# Patient Record
Sex: MALE | Employment: UNEMPLOYED | URBAN - METROPOLITAN AREA
[De-identification: names, ages, dates, MRNs, and addresses within clinical notes are randomized per-mention and may not be internally consistent; named-entity substitution may affect disease eponyms.]

---

## 2020-01-01 ENCOUNTER — APPOINTMENT (OUTPATIENT)
Dept: GENERAL RADIOLOGY | Facility: CLINIC | Age: 50
DRG: 441 | End: 2020-01-01

## 2020-01-01 ENCOUNTER — COMMITTEE REVIEW (OUTPATIENT)
Dept: TRANSPLANT | Facility: CLINIC | Age: 50
End: 2020-01-01

## 2020-01-01 ENCOUNTER — TRANSFERRED RECORDS (OUTPATIENT)
Dept: HEALTH INFORMATION MANAGEMENT | Facility: CLINIC | Age: 50
End: 2020-01-01

## 2020-01-01 ENCOUNTER — APPOINTMENT (OUTPATIENT)
Dept: ULTRASOUND IMAGING | Facility: CLINIC | Age: 50
DRG: 441 | End: 2020-01-01

## 2020-01-01 ENCOUNTER — REFERRAL (OUTPATIENT)
Dept: TRANSPLANT | Facility: CLINIC | Age: 50
End: 2020-01-01

## 2020-01-01 ENCOUNTER — APPOINTMENT (OUTPATIENT)
Dept: GENERAL RADIOLOGY | Facility: CLINIC | Age: 50
DRG: 441 | End: 2020-01-01
Attending: EMERGENCY MEDICINE

## 2020-01-01 ENCOUNTER — APPOINTMENT (OUTPATIENT)
Dept: CT IMAGING | Facility: CLINIC | Age: 50
DRG: 441 | End: 2020-01-01

## 2020-01-01 ENCOUNTER — APPOINTMENT (OUTPATIENT)
Dept: ULTRASOUND IMAGING | Facility: CLINIC | Age: 50
DRG: 441 | End: 2020-01-01
Attending: EMERGENCY MEDICINE

## 2020-01-01 ENCOUNTER — APPOINTMENT (OUTPATIENT)
Dept: CARDIOLOGY | Facility: CLINIC | Age: 50
DRG: 441 | End: 2020-01-01

## 2020-01-01 ENCOUNTER — HOSPITAL ENCOUNTER (INPATIENT)
Facility: CLINIC | Age: 50
LOS: 2 days | DRG: 441 | End: 2020-07-22
Attending: EMERGENCY MEDICINE | Admitting: INTERNAL MEDICINE

## 2020-01-01 VITALS
HEART RATE: 107 BPM | OXYGEN SATURATION: 69 % | HEIGHT: 60 IN | RESPIRATION RATE: 24 BRPM | TEMPERATURE: 96.2 F | BODY MASS INDEX: 24.84 KG/M2 | DIASTOLIC BLOOD PRESSURE: 47 MMHG | SYSTOLIC BLOOD PRESSURE: 98 MMHG | WEIGHT: 126.54 LBS

## 2020-01-01 DIAGNOSIS — N17.9 ACUTE RENAL FAILURE, UNSPECIFIED ACUTE RENAL FAILURE TYPE (H): ICD-10-CM

## 2020-01-01 DIAGNOSIS — Z20.822 2019 NOVEL CORONAVIRUS NOT DETECTED: ICD-10-CM

## 2020-01-01 DIAGNOSIS — K74.60 CIRRHOSIS OF LIVER (H): Primary | ICD-10-CM

## 2020-01-01 DIAGNOSIS — K75.9 HEPATITIS: ICD-10-CM

## 2020-01-01 DIAGNOSIS — K92.0 GASTROINTESTINAL HEMORRHAGE WITH HEMATEMESIS: ICD-10-CM

## 2020-01-01 DIAGNOSIS — K92.1 GASTROINTESTINAL HEMORRHAGE WITH MELENA: ICD-10-CM

## 2020-01-01 LAB
A1AT PHENOTYP SERPL-IMP: NORMAL
A1AT SERPL-MCNC: 185 MG/DL (ref 90–200)
ABO + RH BLD: NORMAL
ACETAMINOPHEN QUAL: NEGATIVE
ACETAMINOPHEN QUAL: POSITIVE
ALBUMIN SERPL-MCNC: 1.2 G/DL (ref 3.4–5)
ALBUMIN SERPL-MCNC: 1.2 G/DL (ref 3.4–5)
ALBUMIN SERPL-MCNC: 1.4 G/DL (ref 3.4–5)
ALBUMIN UR-MCNC: 100 MG/DL
ALCOHOL.XXX SERPLBLD QL: NEGATIVE
ALCOHOL.XXX SERPLBLD QL: NORMAL
ALP SERPL-CCNC: 536 U/L (ref 40–150)
ALP SERPL-CCNC: 564 U/L (ref 40–150)
ALP SERPL-CCNC: 607 U/L (ref 40–150)
ALP SERPL-CCNC: 608 U/L (ref 40–150)
ALP SERPL-CCNC: 653 U/L (ref 40–150)
ALT SERPL W P-5'-P-CCNC: 1794 U/L (ref 0–70)
ALT SERPL W P-5'-P-CCNC: 1860 U/L (ref 0–70)
ALT SERPL W P-5'-P-CCNC: 1969 U/L (ref 0–70)
ALT SERPL W P-5'-P-CCNC: 2410 U/L (ref 0–70)
ALT SERPL W P-5'-P-CCNC: 2553 U/L (ref 0–70)
AMANTADINE: NEGATIVE
AMITRIPTYLINE QUAL: NEGATIVE
AMMONIA PLAS-SCNC: 70 UMOL/L (ref 10–50)
AMMONIA PLAS-SCNC: 71 UMOL/L (ref 10–50)
AMOBARBITAL QUAL: NEGATIVE
AMORPH CRY #/AREA URNS HPF: ABNORMAL /HPF
AMOXAPINE: NEGATIVE
AMPHETAMINES QUAL: NEGATIVE
ANA SER QL IF: NEGATIVE
ANION GAP SERPL CALCULATED.3IONS-SCNC: 14 MMOL/L (ref 3–14)
ANION GAP SERPL CALCULATED.3IONS-SCNC: 15 MMOL/L (ref 3–14)
ANION GAP SERPL CALCULATED.3IONS-SCNC: 15 MMOL/L (ref 3–14)
ANION GAP SERPL CALCULATED.3IONS-SCNC: 16 MMOL/L (ref 3–14)
ANION GAP SERPL CALCULATED.3IONS-SCNC: 16 MMOL/L (ref 3–14)
ANION GAP SERPL CALCULATED.3IONS-SCNC: 17 MMOL/L (ref 3–14)
ANION GAP SERPL CALCULATED.3IONS-SCNC: 19 MMOL/L (ref 3–14)
ANION GAP SERPL CALCULATED.3IONS-SCNC: 21 MMOL/L (ref 3–14)
ANION GAP SERPL CALCULATED.3IONS-SCNC: 21 MMOL/L (ref 3–14)
ANION GAP SERPL CALCULATED.3IONS-SCNC: 24 MMOL/L (ref 3–14)
ANION GAP SERPL CALCULATED.3IONS-SCNC: 26 MMOL/L (ref 3–14)
ANION GAP SERPL CALCULATED.3IONS-SCNC: 34 MMOL/L (ref 3–14)
ANISOCYTOSIS BLD QL SMEAR: SLIGHT
ANISOCYTOSIS BLD QL SMEAR: SLIGHT
APAP SERPL-MCNC: 12.6 UG/ML (ref 10–30)
APAP SERPL-MCNC: 25.8 UG/ML (ref 10–30)
APAP SERPL-MCNC: NORMAL MG/L (ref 10–20)
APAP SERPL-MCNC: NORMAL MG/L (ref 10–20)
APPEARANCE FLD: NORMAL
APPEARANCE UR: ABNORMAL
APTT PPP: 110 SEC (ref 22–37)
APTT PPP: 111 SEC (ref 22–37)
APTT PPP: 122 SEC (ref 22–37)
APTT PPP: 133 SEC (ref 22–37)
APTT PPP: 161 SEC (ref 22–37)
APTT PPP: 212 SEC (ref 22–37)
AST SERPL W P-5'-P-CCNC: 5392 U/L (ref 0–45)
AST SERPL W P-5'-P-CCNC: 6002 U/L (ref 0–45)
AST SERPL W P-5'-P-CCNC: 6396 U/L (ref 0–45)
AST SERPL W P-5'-P-CCNC: 9141 U/L (ref 0–45)
AST SERPL W P-5'-P-CCNC: 9606 U/L (ref 0–45)
ATROPINE: NEGATIVE
BACTERIA #/AREA URNS HPF: ABNORMAL /HPF
BACTERIA SPEC CULT: NO GROWTH
BARBITAL QUAL: NEGATIVE
BASE DEFICIT BLDA-SCNC: 10 MMOL/L
BASE DEFICIT BLDA-SCNC: 10.2 MMOL/L
BASE DEFICIT BLDA-SCNC: 14.2 MMOL/L
BASE DEFICIT BLDA-SCNC: 14.6 MMOL/L
BASE DEFICIT BLDA-SCNC: 15.9 MMOL/L
BASE DEFICIT BLDA-SCNC: 16.6 MMOL/L
BASE DEFICIT BLDA-SCNC: 19 MMOL/L
BASE DEFICIT BLDA-SCNC: 6 MMOL/L
BASE DEFICIT BLDA-SCNC: 6.9 MMOL/L
BASE DEFICIT BLDA-SCNC: 7.3 MMOL/L
BASE DEFICIT BLDA-SCNC: 7.3 MMOL/L
BASE DEFICIT BLDA-SCNC: 7.7 MMOL/L
BASE DEFICIT BLDV-SCNC: 12.4 MMOL/L
BASE DEFICIT BLDV-SCNC: 13.2 MMOL/L
BASE DEFICIT BLDV-SCNC: 14.9 MMOL/L
BASOPHILS # BLD AUTO: 0 10E9/L (ref 0–0.2)
BASOPHILS NFR BLD AUTO: 0 %
BENZODIAZ UR QL: NEGATIVE
BILIRUB DIRECT SERPL-MCNC: 13.3 MG/DL (ref 0–0.2)
BILIRUB DIRECT SERPL-MCNC: 13.3 MG/DL (ref 0–0.2)
BILIRUB SERPL-MCNC: 11 MG/DL (ref 0.2–1.3)
BILIRUB SERPL-MCNC: 16.3 MG/DL (ref 0.2–1.3)
BILIRUB SERPL-MCNC: 17.3 MG/DL (ref 0.2–1.3)
BILIRUB SERPL-MCNC: 17.6 MG/DL (ref 0.2–1.3)
BILIRUB SERPL-MCNC: 18 MG/DL (ref 0.2–1.3)
BILIRUB SERPL-MCNC: 19.7 MG/DL (ref 0.2–1.3)
BILIRUB UR QL STRIP: ABNORMAL
BLD GP AB SCN SERPL QL: NORMAL
BLD GP AB SCN SERPL QL: NORMAL
BLD PROD TYP BPU: NORMAL
BLD UNIT ID BPU: 0
BLOOD BANK CMNT PATIENT-IMP: NORMAL
BLOOD BANK CMNT PATIENT-IMP: NORMAL
BLOOD PRODUCT CODE: NORMAL
BPU ID: NORMAL
BUN SERPL-MCNC: 100 MG/DL (ref 7–30)
BUN SERPL-MCNC: 51 MG/DL (ref 7–30)
BUN SERPL-MCNC: 61 MG/DL (ref 7–30)
BUN SERPL-MCNC: 76 MG/DL (ref 7–30)
BUN SERPL-MCNC: 90 MG/DL (ref 7–30)
BUN SERPL-MCNC: 92 MG/DL (ref 7–30)
BUN SERPL-MCNC: 94 MG/DL (ref 7–30)
BUN SERPL-MCNC: 94 MG/DL (ref 7–30)
BUN SERPL-MCNC: 96 MG/DL (ref 7–30)
BUN SERPL-MCNC: 97 MG/DL (ref 7–30)
BUN SERPL-MCNC: 98 MG/DL (ref 7–30)
BUN SERPL-MCNC: 99 MG/DL (ref 7–30)
BUPROPION QUAL: NEGATIVE
BURR CELLS BLD QL SMEAR: ABNORMAL
BURR CELLS BLD QL SMEAR: ABNORMAL
BUTABARBITAL QUAL: NEGATIVE
BUTALBITAL QUAL: NEGATIVE
C COLI+JEJUNI+LARI FUSA STL QL NAA+PROBE: NOT DETECTED
C DIFF TOX B STL QL: NEGATIVE
C PNEUM DNA SPEC QL NAA+PROBE: NOT DETECTED
CA-I BLD-MCNC: 2.8 MG/DL (ref 4.4–5.2)
CA-I BLD-MCNC: 2.9 MG/DL (ref 4.4–5.2)
CA-I BLD-MCNC: 3 MG/DL (ref 4.4–5.2)
CA-I BLD-MCNC: 3 MG/DL (ref 4.4–5.2)
CA-I BLD-MCNC: 3.2 MG/DL (ref 4.4–5.2)
CA-I BLD-MCNC: 3.4 MG/DL (ref 4.4–5.2)
CA-I BLD-MCNC: 3.6 MG/DL (ref 4.4–5.2)
CA-I BLD-MCNC: 3.9 MG/DL (ref 4.4–5.2)
CA-I BLD-MCNC: 4.4 MG/DL (ref 4.4–5.2)
CA-I BLD-MCNC: 4.4 MG/DL (ref 4.4–5.2)
CA-I BLD-MCNC: 4.7 MG/DL (ref 4.4–5.2)
CA-I BLD-SCNC: 2.9 MG/DL (ref 4.4–5.2)
CA-I SERPL ISE-MCNC: 3.7 MG/DL (ref 4.4–5.2)
CA-I SERPL ISE-MCNC: 4.2 MG/DL (ref 4.4–5.2)
CAFFEINE QUAL: NEGATIVE
CAFFEINE QUAL: NEGATIVE
CALCIUM SERPL-MCNC: 5 MG/DL (ref 8.5–10.1)
CALCIUM SERPL-MCNC: 5.1 MG/DL (ref 8.5–10.1)
CALCIUM SERPL-MCNC: 5.1 MG/DL (ref 8.5–10.1)
CALCIUM SERPL-MCNC: 5.2 MG/DL (ref 8.5–10.1)
CALCIUM SERPL-MCNC: 5.8 MG/DL (ref 8.5–10.1)
CALCIUM SERPL-MCNC: 6.4 MG/DL (ref 8.5–10.1)
CALCIUM SERPL-MCNC: 7 MG/DL (ref 8.5–10.1)
CALCIUM SERPL-MCNC: 7.2 MG/DL (ref 8.5–10.1)
CALCIUM SERPL-MCNC: 7.2 MG/DL (ref 8.5–10.1)
CALCIUM SERPL-MCNC: 7.8 MG/DL (ref 8.5–10.1)
CALCIUM SERPL-MCNC: 8.5 MG/DL (ref 8.5–10.1)
CALCIUM SERPL-MCNC: <5 MG/DL (ref 8.5–10.1)
CANNABINOIDS UR QL SCN: NEGATIVE
CARBAMAZEPINE QUAL: NEGATIVE
CARBAMAZEPINE QUAL: NEGATIVE
CARISOPRODOL QUAL: NEGATIVE
CERULOPLASMIN SERPL-MCNC: 28 MG/DL (ref 20–60)
CHLORIDE SERPL-SCNC: 100 MMOL/L (ref 94–109)
CHLORIDE SERPL-SCNC: 101 MMOL/L (ref 94–109)
CHLORIDE SERPL-SCNC: 102 MMOL/L (ref 94–109)
CHLORIDE SERPL-SCNC: 102 MMOL/L (ref 94–109)
CHLORIDE SERPL-SCNC: 104 MMOL/L (ref 94–109)
CHLORIDE SERPL-SCNC: 105 MMOL/L (ref 94–109)
CHLORIDE SERPL-SCNC: 106 MMOL/L (ref 94–109)
CHLORIDE SERPL-SCNC: 106 MMOL/L (ref 94–109)
CHLORIDE SERPL-SCNC: 98 MMOL/L (ref 94–109)
CHLORIDE SERPL-SCNC: 99 MMOL/L (ref 94–109)
CHLORPHENIRAMINE: NEGATIVE
CHLORPROMAZINE: NEGATIVE
CHLORPROPAMIDE UR-MCNC: NEGATIVE UG/ML
CHOLEST SERPL-MCNC: ABNORMAL MG/DL
CITALOPRAM QUAL: NEGATIVE
CK SERPL-CCNC: 1674 U/L (ref 30–300)
CK SERPL-CCNC: 716 U/L (ref 30–300)
CLOMIPRAMINE QUAL: NEGATIVE
CMV DNA SPEC NAA+PROBE-ACNC: NORMAL [IU]/ML
CMV DNA SPEC NAA+PROBE-ACNC: NORMAL [IU]/ML
CMV DNA SPEC NAA+PROBE-LOG#: NORMAL {LOG_IU}/ML
CMV DNA SPEC NAA+PROBE-LOG#: NORMAL {LOG_IU}/ML
CMV IGG SERPL QL IA: 7.7 AI (ref 0–0.8)
CO2 BLDCOV-SCNC: 7 MMOL/L (ref 21–28)
CO2 SERPL-SCNC: 10 MMOL/L (ref 20–32)
CO2 SERPL-SCNC: 10 MMOL/L (ref 20–32)
CO2 SERPL-SCNC: 11 MMOL/L (ref 20–32)
CO2 SERPL-SCNC: 12 MMOL/L (ref 20–32)
CO2 SERPL-SCNC: 13 MMOL/L (ref 20–32)
CO2 SERPL-SCNC: 14 MMOL/L (ref 20–32)
CO2 SERPL-SCNC: 18 MMOL/L (ref 20–32)
CO2 SERPL-SCNC: 18 MMOL/L (ref 20–32)
CO2 SERPL-SCNC: 19 MMOL/L (ref 20–32)
CO2 SERPL-SCNC: 19 MMOL/L (ref 20–32)
CO2 SERPL-SCNC: 21 MMOL/L (ref 20–32)
CO2 SERPL-SCNC: 7 MMOL/L (ref 20–32)
COCAINE QUAL: NEGATIVE
COCAINE UR QL: NEGATIVE
CODEINE QUAL: NEGATIVE
COLOR FLD: YELLOW
COLOR UR AUTO: ABNORMAL
COPATH REPORT: NORMAL
COPATH REPORT: NORMAL
CREAT SERPL-MCNC: 3.77 MG/DL (ref 0.66–1.25)
CREAT SERPL-MCNC: 4.38 MG/DL (ref 0.66–1.25)
CREAT SERPL-MCNC: 5.33 MG/DL (ref 0.66–1.25)
CREAT SERPL-MCNC: 6.05 MG/DL (ref 0.66–1.25)
CREAT SERPL-MCNC: 6.27 MG/DL (ref 0.66–1.25)
CREAT SERPL-MCNC: 6.42 MG/DL (ref 0.66–1.25)
CREAT SERPL-MCNC: 6.62 MG/DL (ref 0.66–1.25)
CREAT SERPL-MCNC: 6.63 MG/DL (ref 0.66–1.25)
CREAT SERPL-MCNC: 6.76 MG/DL (ref 0.66–1.25)
CREAT SERPL-MCNC: 6.8 MG/DL (ref 0.66–1.25)
CREAT SERPL-MCNC: 7.01 MG/DL (ref 0.66–1.25)
CREAT SERPL-MCNC: 7.01 MG/DL (ref 0.66–1.25)
CREAT UR-MCNC: 97 MG/DL
DEPRECATED CALCIDIOL+CALCIFEROL SERPL-MC: <10 UG/L (ref 20–75)
DESIPRAMINE QUAL: NEGATIVE
DEXTROMETHORPHAN: NEGATIVE
DIFFERENTIAL METHOD BLD: ABNORMAL
DIPHENHYDRAMINE: NEGATIVE
DOXEPIN/METABOLITE: NEGATIVE
DOXYLAMINE: NEGATIVE
DRUGS SERPL SCN: NEGATIVE
EBV DNA # SPEC NAA+PROBE: ABNORMAL {COPIES}/ML
EBV DNA SPEC NAA+PROBE-LOG#: 5.8 {LOG_COPIES}/ML
EBV VCA IGG SER QL IA: 4.3 AI (ref 0–0.8)
EC STX1 GENE STL QL NAA+PROBE: NOT DETECTED
EC STX2 GENE STL QL NAA+PROBE: NOT DETECTED
EJECTION FRACTION: NORMAL %
ENTERIC PATHOGEN COMMENT: NORMAL
EOSINOPHIL # BLD AUTO: 0 10E9/L (ref 0–0.7)
EOSINOPHIL # BLD AUTO: 0.1 10E9/L (ref 0–0.7)
EOSINOPHIL NFR BLD AUTO: 3 %
EOSINOPHIL NFR BLD AUTO: 4 %
EOSINOPHIL NFR BLD AUTO: 5 %
EOSINOPHIL NFR BLD AUTO: 5 %
EOSINOPHIL NFR BLD AUTO: 7 %
EPHEDRINE OR PSEUDO: NEGATIVE
ERYTHROCYTE [DISTWIDTH] IN BLOOD BY AUTOMATED COUNT: 16 % (ref 10–15)
ERYTHROCYTE [DISTWIDTH] IN BLOOD BY AUTOMATED COUNT: 16.2 % (ref 10–15)
ERYTHROCYTE [DISTWIDTH] IN BLOOD BY AUTOMATED COUNT: 16.3 % (ref 10–15)
ERYTHROCYTE [DISTWIDTH] IN BLOOD BY AUTOMATED COUNT: 16.4 % (ref 10–15)
ERYTHROCYTE [DISTWIDTH] IN BLOOD BY AUTOMATED COUNT: 16.5 % (ref 10–15)
ERYTHROCYTE [DISTWIDTH] IN BLOOD BY AUTOMATED COUNT: 16.7 % (ref 10–15)
ERYTHROCYTE [DISTWIDTH] IN BLOOD BY AUTOMATED COUNT: 16.9 % (ref 10–15)
ERYTHROCYTE [DISTWIDTH] IN BLOOD BY AUTOMATED COUNT: 16.9 % (ref 10–15)
ERYTHROCYTE [DISTWIDTH] IN BLOOD BY AUTOMATED COUNT: 17.2 % (ref 10–15)
ERYTHROCYTE [DISTWIDTH] IN BLOOD BY AUTOMATED COUNT: 17.2 % (ref 10–15)
ETHANOL SERPL-MCNC: <0.01 G/DL
ETHCLORVYNOL QUAL: NEGATIVE
ETHINAMATE QUAL: NEGATIVE
ETHOSUXIMIDE QUAL: NEGATIVE
ETHOTOIN QUAL: NEGATIVE
ETHYL GLUCURONIDE UR QL: NEGATIVE
FACT V ACT/NOR PPP: 27 % (ref 60–140)
FACT V ACT/NOR PPP: NORMAL % (ref 60–140)
FACT VIII ACT/NOR PPP: 124 % (ref 55–200)
FACT VIII ACT/NOR PPP: NORMAL % (ref 55–200)
FENTANYL QUAL: NEGATIVE
FERRITIN SERPL-MCNC: ABNORMAL NG/ML (ref 26–388)
FIBRINOGEN PPP-MCNC: 105 MG/DL (ref 200–420)
FIBRINOGEN PPP-MCNC: 107 MG/DL (ref 200–420)
FIBRINOGEN PPP-MCNC: 115 MG/DL (ref 200–420)
FIBRINOGEN PPP-MCNC: 151 MG/DL (ref 200–420)
FIBRINOGEN PPP-MCNC: 65 MG/DL (ref 200–420)
FIBRINOGEN PPP-MCNC: 76 MG/DL (ref 200–420)
FIBRINOGEN PPP-MCNC: 80 MG/DL (ref 200–420)
FIBRINOGEN PPP-MCNC: 93 MG/DL (ref 200–420)
FIBRINOGEN PPP-MCNC: <61 MG/DL (ref 200–420)
FIBRINOGEN PPP-MCNC: <61 MG/DL (ref 200–420)
FLUAV H1 2009 PAND RNA SPEC QL NAA+PROBE: NEGATIVE
FLUAV H1 2009 PAND RNA SPEC QL NAA+PROBE: NOT DETECTED
FLUAV H1 RNA SPEC QL NAA+PROBE: NEGATIVE
FLUAV H1 RNA SPEC QL NAA+PROBE: NOT DETECTED
FLUAV H3 RNA SPEC QL NAA+PROBE: NEGATIVE
FLUAV H3 RNA SPEC QL NAA+PROBE: NOT DETECTED
FLUAV RNA SPEC QL NAA+PROBE: NEGATIVE
FLUAV RNA SPEC QL NAA+PROBE: NOT DETECTED
FLUBV RNA SPEC QL NAA+PROBE: NEGATIVE
FLUBV RNA SPEC QL NAA+PROBE: NOT DETECTED
FLUOXETINE AND METAB: NEGATIVE
FRACT EXCRET NA UR+SERPL-RTO: 2.2 %
GAMMA INTERFERON BACKGROUND BLD IA-ACNC: 10 IU/ML
GFR SERPL CREATININE-BSD FRML MDRD: 10 ML/MIN/{1.73_M2}
GFR SERPL CREATININE-BSD FRML MDRD: 12 ML/MIN/{1.73_M2}
GFR SERPL CREATININE-BSD FRML MDRD: 15 ML/MIN/{1.73_M2}
GFR SERPL CREATININE-BSD FRML MDRD: 18 ML/MIN/{1.73_M2}
GFR SERPL CREATININE-BSD FRML MDRD: 8 ML/MIN/{1.73_M2}
GFR SERPL CREATININE-BSD FRML MDRD: 8 ML/MIN/{1.73_M2}
GFR SERPL CREATININE-BSD FRML MDRD: 9 ML/MIN/{1.73_M2}
GLUCOSE BLD-MCNC: 134 MG/DL (ref 70–99)
GLUCOSE BLDC GLUCOMTR-MCNC: 104 MG/DL (ref 70–99)
GLUCOSE BLDC GLUCOMTR-MCNC: 105 MG/DL (ref 70–99)
GLUCOSE BLDC GLUCOMTR-MCNC: 111 MG/DL (ref 70–99)
GLUCOSE BLDC GLUCOMTR-MCNC: 112 MG/DL (ref 70–99)
GLUCOSE BLDC GLUCOMTR-MCNC: 112 MG/DL (ref 70–99)
GLUCOSE BLDC GLUCOMTR-MCNC: 121 MG/DL (ref 70–99)
GLUCOSE BLDC GLUCOMTR-MCNC: 123 MG/DL (ref 70–99)
GLUCOSE BLDC GLUCOMTR-MCNC: 128 MG/DL (ref 70–99)
GLUCOSE BLDC GLUCOMTR-MCNC: 141 MG/DL (ref 70–99)
GLUCOSE BLDC GLUCOMTR-MCNC: 145 MG/DL (ref 70–99)
GLUCOSE BLDC GLUCOMTR-MCNC: 153 MG/DL (ref 70–99)
GLUCOSE BLDC GLUCOMTR-MCNC: 157 MG/DL (ref 70–99)
GLUCOSE BLDC GLUCOMTR-MCNC: 158 MG/DL (ref 70–99)
GLUCOSE BLDC GLUCOMTR-MCNC: 204 MG/DL (ref 70–99)
GLUCOSE BLDC GLUCOMTR-MCNC: 222 MG/DL (ref 70–99)
GLUCOSE BLDC GLUCOMTR-MCNC: 62 MG/DL (ref 70–99)
GLUCOSE BLDC GLUCOMTR-MCNC: 64 MG/DL (ref 70–99)
GLUCOSE BLDC GLUCOMTR-MCNC: 66 MG/DL (ref 70–99)
GLUCOSE BLDC GLUCOMTR-MCNC: 67 MG/DL (ref 70–99)
GLUCOSE BLDC GLUCOMTR-MCNC: 69 MG/DL (ref 70–99)
GLUCOSE BLDC GLUCOMTR-MCNC: 73 MG/DL (ref 70–99)
GLUCOSE BLDC GLUCOMTR-MCNC: 74 MG/DL (ref 70–99)
GLUCOSE BLDC GLUCOMTR-MCNC: 77 MG/DL (ref 70–99)
GLUCOSE BLDC GLUCOMTR-MCNC: 78 MG/DL (ref 70–99)
GLUCOSE BLDC GLUCOMTR-MCNC: 80 MG/DL (ref 70–99)
GLUCOSE BLDC GLUCOMTR-MCNC: 80 MG/DL (ref 70–99)
GLUCOSE BLDC GLUCOMTR-MCNC: 82 MG/DL (ref 70–99)
GLUCOSE BLDC GLUCOMTR-MCNC: 83 MG/DL (ref 70–99)
GLUCOSE BLDC GLUCOMTR-MCNC: 85 MG/DL (ref 70–99)
GLUCOSE BLDC GLUCOMTR-MCNC: 90 MG/DL (ref 70–99)
GLUCOSE SERPL-MCNC: 101 MG/DL (ref 70–99)
GLUCOSE SERPL-MCNC: 119 MG/DL (ref 70–99)
GLUCOSE SERPL-MCNC: 149 MG/DL (ref 70–99)
GLUCOSE SERPL-MCNC: 202 MG/DL (ref 70–99)
GLUCOSE SERPL-MCNC: 58 MG/DL (ref 70–99)
GLUCOSE SERPL-MCNC: 58 MG/DL (ref 70–99)
GLUCOSE SERPL-MCNC: 65 MG/DL (ref 70–99)
GLUCOSE SERPL-MCNC: 67 MG/DL (ref 70–99)
GLUCOSE SERPL-MCNC: 72 MG/DL (ref 70–99)
GLUCOSE SERPL-MCNC: 75 MG/DL (ref 70–99)
GLUCOSE SERPL-MCNC: 88 MG/DL (ref 70–99)
GLUCOSE SERPL-MCNC: 99 MG/DL (ref 70–99)
GLUCOSE UR STRIP-MCNC: NEGATIVE MG/DL
GLUTETHIMIDE QUAL: NEGATIVE
GRAM STN SPEC: ABNORMAL
GRAM STN SPEC: ABNORMAL
GRAM STN SPEC: NORMAL
HADV DNA # SPEC NAA+PROBE: NORMAL COPIES/ML
HADV DNA SPEC NAA+PROBE-LOG#: NORMAL LOG COPIES/ML
HADV DNA SPEC QL NAA+PROBE: NEGATIVE
HADV DNA SPEC QL NAA+PROBE: NEGATIVE
HADV DNA SPEC QL NAA+PROBE: NOT DETECTED
HAV IGG SER QL IA: REACTIVE
HAV IGM SERPL QL IA: NONREACTIVE
HBV CORE AB SERPL QL IA: REACTIVE
HBV CORE IGM SERPL QL IA: NONREACTIVE
HBV SURFACE AB SERPL IA-ACNC: 20.01 M[IU]/ML
HBV SURFACE AG SERPL QL IA: NONREACTIVE
HCO3 BLD-SCNC: 10 MMOL/L (ref 21–28)
HCO3 BLD-SCNC: 11 MMOL/L (ref 21–28)
HCO3 BLD-SCNC: 12 MMOL/L (ref 21–28)
HCO3 BLD-SCNC: 14 MMOL/L (ref 21–28)
HCO3 BLD-SCNC: 16 MMOL/L (ref 21–28)
HCO3 BLD-SCNC: 17 MMOL/L (ref 21–28)
HCO3 BLD-SCNC: 19 MMOL/L (ref 21–28)
HCO3 BLD-SCNC: 19 MMOL/L (ref 21–28)
HCO3 BLD-SCNC: 20 MMOL/L (ref 21–28)
HCO3 BLD-SCNC: 20 MMOL/L (ref 21–28)
HCO3 BLD-SCNC: 21 MMOL/L (ref 21–28)
HCO3 BLD-SCNC: 9 MMOL/L (ref 21–28)
HCO3 BLDV-SCNC: 10 MMOL/L (ref 21–28)
HCO3 BLDV-SCNC: 11 MMOL/L (ref 21–28)
HCO3 BLDV-SCNC: 16 MMOL/L (ref 21–28)
HCOV PNL SPEC NAA+PROBE: NOT DETECTED
HCT VFR BLD AUTO: 15.8 % (ref 40–53)
HCT VFR BLD AUTO: 16 % (ref 40–53)
HCT VFR BLD AUTO: 17.1 % (ref 40–53)
HCT VFR BLD AUTO: 18.1 % (ref 40–53)
HCT VFR BLD AUTO: 19.1 % (ref 40–53)
HCT VFR BLD AUTO: 20.5 % (ref 40–53)
HCT VFR BLD AUTO: 20.9 % (ref 40–53)
HCT VFR BLD AUTO: 22.7 % (ref 40–53)
HCT VFR BLD AUTO: 24.8 % (ref 40–53)
HCT VFR BLD AUTO: 24.8 % (ref 40–53)
HCT VFR BLD AUTO: 26.7 % (ref 40–53)
HCT VFR BLD AUTO: 27.5 % (ref 40–53)
HCT VFR BLD CALC: 22 %PCV (ref 40–53)
HCV AB SERPL QL IA: NONREACTIVE
HDLC SERPL-MCNC: 7 MG/DL
HGB BLD CALC-MCNC: 7.5 G/DL (ref 13.3–17.7)
HGB BLD-MCNC: 5.2 G/DL (ref 13.3–17.7)
HGB BLD-MCNC: 5.4 G/DL (ref 13.3–17.7)
HGB BLD-MCNC: 5.7 G/DL (ref 13.3–17.7)
HGB BLD-MCNC: 6.1 G/DL (ref 13.3–17.7)
HGB BLD-MCNC: 6.3 G/DL (ref 13.3–17.7)
HGB BLD-MCNC: 7 G/DL (ref 13.3–17.7)
HGB BLD-MCNC: 7 G/DL (ref 13.3–17.7)
HGB BLD-MCNC: 7.7 G/DL (ref 13.3–17.7)
HGB BLD-MCNC: 7.9 G/DL (ref 13.3–17.7)
HGB BLD-MCNC: 8.2 G/DL (ref 13.3–17.7)
HGB BLD-MCNC: 8.7 G/DL (ref 13.3–17.7)
HGB BLD-MCNC: 9.1 G/DL (ref 13.3–17.7)
HGB UR QL STRIP: ABNORMAL
HIV 1+2 AB+HIV1 P24 AG SERPL QL IA: NONREACTIVE
HIV 1+2 AB+HIV1 P24 AG SERPL QL IA: NONREACTIVE
HMPV RNA SPEC QL NAA+PROBE: NEGATIVE
HMPV RNA SPEC QL NAA+PROBE: NOT DETECTED
HPIV1 RNA SPEC QL NAA+PROBE: NEGATIVE
HPIV1 RNA SPEC QL NAA+PROBE: NOT DETECTED
HPIV2 RNA SPEC QL NAA+PROBE: NEGATIVE
HPIV2 RNA SPEC QL NAA+PROBE: NOT DETECTED
HPIV3 RNA SPEC QL NAA+PROBE: NEGATIVE
HPIV3 RNA SPEC QL NAA+PROBE: NOT DETECTED
HPIV4 RNA SPEC QL NAA+PROBE: NOT DETECTED
HSV1 DNA SPEC QL NAA+PROBE: NEGATIVE
HSV2 DNA SPEC QL NAA+PROBE: NEGATIVE
HYALINE CASTS #/AREA URNS LPF: 2 /LPF (ref 0–2)
HYDROCODONE QUAL: NEGATIVE
HYDROMORPHONE QUAL: NEGATIVE
IBUPROFEN QUAL: NEGATIVE
IBUPROFEN QUAL: NEGATIVE
IMIPRAMINE QUAL: NEGATIVE
INR PPP: 3.03 (ref 0.86–1.14)
INR PPP: 3.87 (ref 0.86–1.14)
INR PPP: 4.02 (ref 0.86–1.14)
INR PPP: 4.69 (ref 0.86–1.14)
INR PPP: 4.85 (ref 0.86–1.14)
INR PPP: 4.92 (ref 0.86–1.14)
INR PPP: 5.32 (ref 0.86–1.14)
INR PPP: 5.35 (ref 0.86–1.14)
INR PPP: 5.96 (ref 0.86–1.14)
INR PPP: 6.35 (ref 0.86–1.14)
INR PPP: 8.05 (ref 0.86–1.14)
INTERPRETATION ECG - MUSE: NORMAL
INTERPRETATION ECG - MUSE: NORMAL
IRON SATN MFR SERPL: 146 % (ref 15–46)
IRON SATN MFR SERPL: 189 % (ref 15–46)
IRON SERPL-MCNC: 168 UG/DL (ref 35–180)
IRON SERPL-MCNC: 172 UG/DL (ref 35–180)
KETAMINE QUAL: NEGATIVE
KETONES UR STRIP-MCNC: NEGATIVE MG/DL
KOH PREP SPEC: ABNORMAL
LAB SCANNED RESULT: ABNORMAL
LAB SCANNED RESULT: NORMAL
LABORATORY COMMENT REPORT: NORMAL
LACTATE BLD-SCNC: 1.6 MMOL/L (ref 0.7–2)
LACTATE BLD-SCNC: 11.5 MMOL/L (ref 0.7–2)
LACTATE BLD-SCNC: 12.2 MMOL/L (ref 0.7–2)
LACTATE BLD-SCNC: 13.1 MMOL/L (ref 0.7–2)
LACTATE BLD-SCNC: 13.2 MMOL/L (ref 0.7–2)
LACTATE BLD-SCNC: 15 MMOL/L (ref 0.7–2)
LACTATE BLD-SCNC: 16 MMOL/L (ref 0.7–2)
LACTATE BLD-SCNC: 17 MMOL/L (ref 0.7–2)
LACTATE BLD-SCNC: 19 MMOL/L (ref 0.7–2)
LACTATE BLD-SCNC: 4.1 MMOL/L (ref 0.7–2)
LACTATE BLD-SCNC: 7.1 MMOL/L (ref 0.7–2)
LACTATE BLD-SCNC: >24 MMOL/L (ref 0.7–2)
LAMOTRIGINE QUAL: NEGATIVE
LDH SERPL L TO P-CCNC: 9050 U/L (ref 85–227)
LDLC SERPL CALC-MCNC: ABNORMAL MG/DL
LEUKOCYTE ESTERASE UR QL STRIP: ABNORMAL
LIDOCAIN SPEC QL: NEGATIVE
LIPASE SERPL-CCNC: 282 U/L (ref 73–393)
LOXAPINE: NEGATIVE
LYMPHOCYTES # BLD AUTO: 0.4 10E9/L (ref 0.8–5.3)
LYMPHOCYTES # BLD AUTO: 0.4 10E9/L (ref 0.8–5.3)
LYMPHOCYTES # BLD AUTO: 0.6 10E9/L (ref 0.8–5.3)
LYMPHOCYTES # BLD AUTO: 0.7 10E9/L (ref 0.8–5.3)
LYMPHOCYTES # BLD AUTO: 0.8 10E9/L (ref 0.8–5.3)
LYMPHOCYTES NFR BLD AUTO: 27 %
LYMPHOCYTES NFR BLD AUTO: 33 %
LYMPHOCYTES NFR BLD AUTO: 35 %
LYMPHOCYTES NFR BLD AUTO: 36 %
LYMPHOCYTES NFR BLD AUTO: 36 %
LYMPHOCYTES NFR FLD MANUAL: 11 %
Lab: NORMAL
M PNEUMO DNA SPEC QL NAA+PROBE: NOT DETECTED
M TB IFN-G CD4+ BCKGRND COR BLD-ACNC: 0 IU/ML
M TB TUBERC IFN-G BLD QL: NORMAL
MAGNESIUM SERPL-MCNC: 2 MG/DL (ref 1.6–2.3)
MAGNESIUM SERPL-MCNC: 2.2 MG/DL (ref 1.6–2.3)
MAGNESIUM SERPL-MCNC: 2.3 MG/DL (ref 1.6–2.3)
MAGNESIUM SERPL-MCNC: 2.4 MG/DL (ref 1.6–2.3)
MAGNESIUM SERPL-MCNC: 2.5 MG/DL (ref 1.6–2.3)
MAGNESIUM SERPL-MCNC: 2.6 MG/DL (ref 1.6–2.3)
MAGNESIUM SERPL-MCNC: 2.8 MG/DL (ref 1.6–2.3)
MAPROTYLINE: NEGATIVE
MCH RBC QN AUTO: 26.8 PG (ref 26.5–33)
MCH RBC QN AUTO: 26.9 PG (ref 26.5–33)
MCH RBC QN AUTO: 26.9 PG (ref 26.5–33)
MCH RBC QN AUTO: 27.3 PG (ref 26.5–33)
MCH RBC QN AUTO: 27.3 PG (ref 26.5–33)
MCH RBC QN AUTO: 27.8 PG (ref 26.5–33)
MCH RBC QN AUTO: 28 PG (ref 26.5–33)
MCH RBC QN AUTO: 28.3 PG (ref 26.5–33)
MCH RBC QN AUTO: 28.5 PG (ref 26.5–33)
MCH RBC QN AUTO: 29 PG (ref 26.5–33)
MCH RBC QN AUTO: 29.4 PG (ref 26.5–33)
MCH RBC QN AUTO: 29.5 PG (ref 26.5–33)
MCHC RBC AUTO-ENTMCNC: 31.9 G/DL (ref 31.5–36.5)
MCHC RBC AUTO-ENTMCNC: 32.6 G/DL (ref 31.5–36.5)
MCHC RBC AUTO-ENTMCNC: 32.9 G/DL (ref 31.5–36.5)
MCHC RBC AUTO-ENTMCNC: 33 G/DL (ref 31.5–36.5)
MCHC RBC AUTO-ENTMCNC: 33.1 G/DL (ref 31.5–36.5)
MCHC RBC AUTO-ENTMCNC: 33.1 G/DL (ref 31.5–36.5)
MCHC RBC AUTO-ENTMCNC: 33.3 G/DL (ref 31.5–36.5)
MCHC RBC AUTO-ENTMCNC: 33.5 G/DL (ref 31.5–36.5)
MCHC RBC AUTO-ENTMCNC: 33.7 G/DL (ref 31.5–36.5)
MCHC RBC AUTO-ENTMCNC: 33.8 G/DL (ref 31.5–36.5)
MCHC RBC AUTO-ENTMCNC: 33.9 G/DL (ref 31.5–36.5)
MCHC RBC AUTO-ENTMCNC: 34.1 G/DL (ref 31.5–36.5)
MCV RBC AUTO: 80 FL (ref 78–100)
MCV RBC AUTO: 81 FL (ref 78–100)
MCV RBC AUTO: 83 FL (ref 78–100)
MCV RBC AUTO: 83 FL (ref 78–100)
MCV RBC AUTO: 84 FL (ref 78–100)
MCV RBC AUTO: 86 FL (ref 78–100)
MCV RBC AUTO: 89 FL (ref 78–100)
MCV RBC AUTO: 90 FL (ref 78–100)
MCV RBC AUTO: 91 FL (ref 78–100)
MDMA QUAL: NEGATIVE
MEPERIDINE QUAL: NEGATIVE
MEPHENYTOIN QUAL: NEGATIVE
MEPHOBARBITAL QUAL: NEGATIVE
MEPROBAMATE QUAL: NEGATIVE
METAMYELOCYTES # BLD: 0 10E9/L
METAMYELOCYTES NFR BLD MANUAL: 1 %
METAMYELOCYTES NFR BLD MANUAL: 2 %
METAMYELOCYTES NFR BLD MANUAL: 3 %
METHAMPHETAMINE: NEGATIVE
METHAQUALONE QUAL: NEGATIVE
METHARBITAL QUAL: NEGATIVE
METHODONE QUAL: NEGATIVE
METHSUXIMIDE QUAL: NEGATIVE
METHYPRYLON QUAL: NEGATIVE
MICROBIOLOGIST REVIEW: NORMAL
MICROBIOLOGIST REVIEW: NORMAL
MICROCYTES BLD QL SMEAR: PRESENT
MIRTAZAPINE QUAL: NEGATIVE
MISCELLANEOUS TEST: NORMAL
MITOGEN IGNF BCKGRD COR BLD-ACNC: 0 IU/ML
MITOGEN IGNF BCKGRD COR BLD-ACNC: 0 IU/ML
MONOCYTES # BLD AUTO: 0 10E9/L (ref 0–1.3)
MONOCYTES # BLD AUTO: 0.1 10E9/L (ref 0–1.3)
MONOCYTES # BLD AUTO: 0.1 10E9/L (ref 0–1.3)
MONOCYTES NFR BLD AUTO: 0 %
MONOCYTES NFR BLD AUTO: 3 %
MONOCYTES NFR BLD AUTO: 5 %
MORPHINE QUAL: NEGATIVE
MPX SERIES: NORMAL
MPX SERIES: NORMAL
MUCOUS THREADS #/AREA URNS LPF: PRESENT /LPF
NEUTROPHILS # BLD AUTO: 0.6 10E9/L (ref 1.6–8.3)
NEUTROPHILS # BLD AUTO: 0.7 10E9/L (ref 1.6–8.3)
NEUTROPHILS # BLD AUTO: 1 10E9/L (ref 1.6–8.3)
NEUTROPHILS # BLD AUTO: 1.1 10E9/L (ref 1.6–8.3)
NEUTROPHILS # BLD AUTO: 1.6 10E9/L (ref 1.6–8.3)
NEUTROPHILS NFR BLD AUTO: 53 %
NEUTROPHILS NFR BLD AUTO: 54 %
NEUTROPHILS NFR BLD AUTO: 57 %
NEUTROPHILS NFR BLD AUTO: 58 %
NEUTROPHILS NFR BLD AUTO: 65 %
NEUTS BAND NFR FLD MANUAL: 1 %
NICOTINE: NEGATIVE
NITRATE UR QL: NEGATIVE
NONHDLC SERPL-MCNC: ABNORMAL MG/DL
NOROV GI+II ORF1-ORF2 JNC STL QL NAA+PR: NOT DETECTED
NORTRIPTYLINE QUAL: NEGATIVE
NRBC # BLD AUTO: 0.1 10*3/UL
NRBC # BLD AUTO: 0.1 10*3/UL
NRBC # BLD AUTO: 0.5 10*3/UL
NRBC # BLD AUTO: 0.6 10*3/UL
NRBC BLD AUTO-RTO: 23 /100
NRBC BLD AUTO-RTO: 49 /100
NRBC BLD AUTO-RTO: 6 /100
NRBC BLD AUTO-RTO: 7 /100
NUM BPU REQUESTED: 0
NUM BPU REQUESTED: 1
NUM BPU REQUESTED: 10
NUM BPU REQUESTED: 10
NUM BPU REQUESTED: 2
NUM BPU REQUESTED: 2
NUM BPU REQUESTED: 3
NUM BPU REQUESTED: 5
O2/TOTAL GAS SETTING VFR VENT: 100 %
O2/TOTAL GAS SETTING VFR VENT: 21 %
O2/TOTAL GAS SETTING VFR VENT: 21 %
O2/TOTAL GAS SETTING VFR VENT: 60 %
O2/TOTAL GAS SETTING VFR VENT: 70 %
O2/TOTAL GAS SETTING VFR VENT: 80 %
O2/TOTAL GAS SETTING VFR VENT: ABNORMAL %
OLANZAPINE QUAL: NEGATIVE
OPIATES UR QL SCN: NEGATIVE
OSMOLALITY SERPL: 298 MMOL/KG (ref 275–295)
OSMOLALITY SERPL: 314 MMOL/KG (ref 275–295)
OSMOLALITY SERPL: 328 MMOL/KG (ref 275–295)
OSMOLALITY UR: 321 MMOL/KG (ref 100–1200)
OTHER CELLS FLD MANUAL: 88 %
OVALOCYTES BLD QL SMEAR: SLIGHT
OVALOCYTES BLD QL SMEAR: SLIGHT
OXYCODONE QUAL: NEGATIVE
OXYHGB MFR BLD: 77 % (ref 92–100)
OXYHGB MFR BLD: 79 % (ref 92–100)
OXYHGB MFR BLD: 95 % (ref 92–100)
OXYHGB MFR BLDV: 79 %
PCO2 BLD: 14 MM HG (ref 35–45)
PCO2 BLD: 33 MM HG (ref 35–45)
PCO2 BLD: 34 MM HG (ref 35–45)
PCO2 BLD: 34 MM HG (ref 35–45)
PCO2 BLD: 37 MM HG (ref 35–45)
PCO2 BLD: 40 MM HG (ref 35–45)
PCO2 BLD: 40 MM HG (ref 35–45)
PCO2 BLD: 42 MM HG (ref 35–45)
PCO2 BLD: 46 MM HG (ref 35–45)
PCO2 BLD: 52 MM HG (ref 35–45)
PCO2 BLD: 53 MM HG (ref 35–45)
PCO2 BLD: 53 MM HG (ref 35–45)
PCO2 BLDV: 16 MM HG (ref 40–50)
PCO2 BLDV: 19 MM HG (ref 40–50)
PCO2 BLDV: 21 MM HG (ref 40–50)
PCO2 BLDV: 49 MM HG (ref 40–50)
PENTAZOCINE: NEGATIVE
PENTOBARBITAL QUAL: NEGATIVE
PH BLD: 7.08 PH (ref 7.35–7.45)
PH BLD: 7.14 PH (ref 7.35–7.45)
PH BLD: 7.14 PH (ref 7.35–7.45)
PH BLD: 7.15 PH (ref 7.35–7.45)
PH BLD: 7.19 PH (ref 7.35–7.45)
PH BLD: 7.19 PH (ref 7.35–7.45)
PH BLD: 7.21 PH (ref 7.35–7.45)
PH BLD: 7.22 PH (ref 7.35–7.45)
PH BLD: 7.23 PH (ref 7.35–7.45)
PH BLD: 7.26 PH (ref 7.35–7.45)
PH BLD: 7.29 PH (ref 7.35–7.45)
PH BLD: 7.4 PH (ref 7.35–7.45)
PH BLDV: 7.12 PH (ref 7.32–7.43)
PH BLDV: 7.22 PH (ref 7.32–7.43)
PH BLDV: 7.33 PH (ref 7.32–7.43)
PH BLDV: 7.34 PH (ref 7.32–7.43)
PH UR STRIP: 5.5 PH (ref 5–7)
PHENACETIN QUAL: NEGATIVE
PHENCYCLIDINE QUAL: NEGATIVE
PHENOBARBITAL QUAL: POSITIVE
PHENSUXIMIDE QUAL: NEGATIVE
PHENTERMINE: NEGATIVE
PHENYTOIN QUAL: NEGATIVE
PHOSPHATE SERPL-MCNC: 12.1 MG/DL (ref 2.5–4.5)
PHOSPHATE SERPL-MCNC: 12.7 MG/DL (ref 2.5–4.5)
PHOSPHATE SERPL-MCNC: 12.7 MG/DL (ref 2.5–4.5)
PHOSPHATE SERPL-MCNC: 8.9 MG/DL (ref 2.5–4.5)
PHOSPHATE SERPL-MCNC: 9.3 MG/DL (ref 2.5–4.5)
PHOSPHATE SERPL-MCNC: 9.3 MG/DL (ref 2.5–4.5)
PLATELET # BLD AUTO: 140 10E9/L (ref 150–450)
PLATELET # BLD AUTO: 16 10E9/L (ref 150–450)
PLATELET # BLD AUTO: 29 10E9/L (ref 150–450)
PLATELET # BLD AUTO: 35 10E9/L (ref 150–450)
PLATELET # BLD AUTO: 38 10E9/L (ref 150–450)
PLATELET # BLD AUTO: 46 10E9/L (ref 150–450)
PLATELET # BLD AUTO: 46 10E9/L (ref 150–450)
PLATELET # BLD AUTO: 47 10E9/L (ref 150–450)
PLATELET # BLD AUTO: 56 10E9/L (ref 150–450)
PLATELET # BLD AUTO: 57 10E9/L (ref 150–450)
PLATELET # BLD AUTO: 67 10E9/L (ref 150–450)
PLATELET # BLD AUTO: 72 10E9/L (ref 150–450)
PLATELET # BLD EST: ABNORMAL 10*3/UL
PLATELET # BLD EST: ABNORMAL 10*3/UL
PO2 BLD: 127 MM HG (ref 80–105)
PO2 BLD: 47 MM HG (ref 80–105)
PO2 BLD: 48 MM HG (ref 80–105)
PO2 BLD: 53 MM HG (ref 80–105)
PO2 BLD: 56 MM HG (ref 80–105)
PO2 BLD: 61 MM HG (ref 80–105)
PO2 BLD: 65 MM HG (ref 80–105)
PO2 BLD: 71 MM HG (ref 80–105)
PO2 BLD: 83 MM HG (ref 80–105)
PO2 BLD: 83 MM HG (ref 80–105)
PO2 BLD: 84 MM HG (ref 80–105)
PO2 BLD: 87 MM HG (ref 80–105)
PO2 BLDV: 33 MM HG (ref 25–47)
PO2 BLDV: 36 MM HG (ref 25–47)
PO2 BLDV: 62 MM HG (ref 25–47)
PO2 BLDV: 64 MM HG (ref 25–47)
POIKILOCYTOSIS BLD QL SMEAR: ABNORMAL
POIKILOCYTOSIS BLD QL SMEAR: ABNORMAL
POIKILOCYTOSIS BLD QL SMEAR: SLIGHT
POLYCHROMASIA BLD QL SMEAR: SLIGHT
POTASSIUM BLD-SCNC: 5.3 MMOL/L (ref 3.4–5.3)
POTASSIUM SERPL-SCNC: 4 MMOL/L (ref 3.4–5.3)
POTASSIUM SERPL-SCNC: 4.6 MMOL/L (ref 3.4–5.3)
POTASSIUM SERPL-SCNC: 4.8 MMOL/L (ref 3.4–5.3)
POTASSIUM SERPL-SCNC: 5.2 MMOL/L (ref 3.4–5.3)
POTASSIUM SERPL-SCNC: 5.2 MMOL/L (ref 3.4–5.3)
POTASSIUM SERPL-SCNC: 5.4 MMOL/L (ref 3.4–5.3)
POTASSIUM SERPL-SCNC: 5.5 MMOL/L (ref 3.4–5.3)
POTASSIUM SERPL-SCNC: 5.5 MMOL/L (ref 3.4–5.3)
POTASSIUM SERPL-SCNC: 5.6 MMOL/L (ref 3.4–5.3)
PRIMIDONE QUAL: NEGATIVE
PROMYELOCYTES # BLD MANUAL: 0 10E9/L
PROMYELOCYTES # BLD MANUAL: 0 10E9/L
PROMYELOCYTES NFR BLD MANUAL: 1 %
PROMYELOCYTES NFR BLD MANUAL: 1 %
PROPOFOL QUAL: NEGATIVE
PROPOXPHENE QUAL: NEGATIVE
PROPRANOLOL QUAL: NEGATIVE
PROT SERPL-MCNC: 2.7 G/DL (ref 6.8–8.8)
PROT SERPL-MCNC: 3.4 G/DL (ref 6.8–8.8)
PROT SERPL-MCNC: 3.5 G/DL (ref 6.8–8.8)
PROT SERPL-MCNC: 3.7 G/DL (ref 6.8–8.8)
PROT SERPL-MCNC: 3.9 G/DL (ref 6.8–8.8)
PSA FREE MFR SERPL: 47 %
PSA FREE SERPL-MCNC: 0.9 NG/ML
PSA SERPL-MCNC: 1.9 NG/ML (ref 0–4)
PTH-INTACT SERPL-MCNC: 552 PG/ML (ref 18–80)
PTH-INTACT SERPL-MCNC: 646 PG/ML (ref 18–80)
PYRILAMINE: NEGATIVE
QUETIAPINE METAB QUAL: NEGATIVE
RADIOLOGIST FLAGS: ABNORMAL
RBC # BLD AUTO: 1.94 10E12/L (ref 4.4–5.9)
RBC # BLD AUTO: 1.98 10E12/L (ref 4.4–5.9)
RBC # BLD AUTO: 2 10E12/L (ref 4.4–5.9)
RBC # BLD AUTO: 2.18 10E12/L (ref 4.4–5.9)
RBC # BLD AUTO: 2.27 10E12/L (ref 4.4–5.9)
RBC # BLD AUTO: 2.47 10E12/L (ref 4.4–5.9)
RBC # BLD AUTO: 2.6 10E12/L (ref 4.4–5.9)
RBC # BLD AUTO: 2.72 10E12/L (ref 4.4–5.9)
RBC # BLD AUTO: 2.82 10E12/L (ref 4.4–5.9)
RBC # BLD AUTO: 2.96 10E12/L (ref 4.4–5.9)
RBC # BLD AUTO: 3.05 10E12/L (ref 4.4–5.9)
RBC # BLD AUTO: 3.08 10E12/L (ref 4.4–5.9)
RBC #/AREA URNS AUTO: 82 /HPF (ref 0–2)
RBC MORPH BLD: NORMAL
RETICS # AUTO: 23 10E9/L (ref 25–95)
RETICS # AUTO: 38.3 10E9/L (ref 25–95)
RETICS/RBC NFR AUTO: 1.2 % (ref 0.5–2)
RETICS/RBC NFR AUTO: 1.5 % (ref 0.5–2)
RHINOVIRUS RNA SPEC QL NAA+PROBE: NEGATIVE
RSV RNA SPEC QL NAA+PROBE: NEGATIVE
RSV RNA SPEC QL NAA+PROBE: NEGATIVE
RSV RNA SPEC QL NAA+PROBE: NOT DETECTED
RSV RNA SPEC QL NAA+PROBE: NOT DETECTED
RV+EV RNA SPEC QL NAA+PROBE: NOT DETECTED
RVA NSP5 STL QL NAA+PROBE: NOT DETECTED
SALICYLATE QUAL: NEGATIVE
SALICYLATE QUAL: NEGATIVE
SALICYLATES SERPL-MCNC: NORMAL MG/DL
SALICYLATES SERPL-MCNC: NORMAL MG/DL
SALMONELLA SP RPOD STL QL NAA+PROBE: NOT DETECTED
SAO2 % BLDV FROM PO2: 61 %
SARS-COV-2 RNA SPEC QL NAA+PROBE: NEGATIVE
SARS-COV-2 RNA SPEC QL NAA+PROBE: NORMAL
SECOBARBITAL QUAL: NEGATIVE
SERTRALINE QUAL: NEGATIVE
SHIGELLA SP+EIEC IPAH STL QL NAA+PROBE: NOT DETECTED
SMA IGG SER-ACNC: 8 UNITS (ref 0–19)
SODIUM BLD-SCNC: 127 MMOL/L (ref 133–144)
SODIUM SERPL-SCNC: 128 MMOL/L (ref 133–144)
SODIUM SERPL-SCNC: 130 MMOL/L (ref 133–144)
SODIUM SERPL-SCNC: 132 MMOL/L (ref 133–144)
SODIUM SERPL-SCNC: 132 MMOL/L (ref 133–144)
SODIUM SERPL-SCNC: 133 MMOL/L (ref 133–144)
SODIUM SERPL-SCNC: 137 MMOL/L (ref 133–144)
SODIUM SERPL-SCNC: 137 MMOL/L (ref 133–144)
SODIUM SERPL-SCNC: 138 MMOL/L (ref 133–144)
SODIUM SERPL-SCNC: 139 MMOL/L (ref 133–144)
SODIUM SERPL-SCNC: 141 MMOL/L (ref 133–144)
SODIUM SERPL-SCNC: 143 MMOL/L (ref 133–144)
SODIUM SERPL-SCNC: 143 MMOL/L (ref 133–144)
SODIUM UR-SCNC: 42 MMOL/L
SODIUM UR-SCNC: 42 MMOL/L
SOURCE: ABNORMAL
SP GR UR STRIP: 1.01 (ref 1–1.03)
SPECIMEN EXP DATE BLD: NORMAL
SPECIMEN EXP DATE BLD: NORMAL
SPECIMEN SOURCE FLD: NORMAL
SPECIMEN SOURCE: ABNORMAL
SPECIMEN SOURCE: ABNORMAL
SPECIMEN SOURCE: NORMAL
T PALLIDUM AB SER QL: NONREACTIVE
T PALLIDUM AB SER QL: NONREACTIVE
T4 FREE SERPL-MCNC: 1.76 NG/DL (ref 0.76–1.46)
T4 FREE SERPL-MCNC: 1.93 NG/DL (ref 0.76–1.46)
TALBUTAL QUAL: NEGATIVE
THEOBROMINE: NEGATIVE
THEOPHYLLINE QUAL: NEGATIVE
THIOPENTAL QUAL: NEGATIVE
TIBC SERPL-MCNC: 117 UG/DL (ref 240–430)
TIBC SERPL-MCNC: 89 UG/DL (ref 240–430)
TOPIRAMATE QUAL: NEGATIVE
TRAMADOL QUAL: NEGATIVE
TRANS CELLS #/AREA URNS HPF: 1 /HPF (ref 0–1)
TRANSFUSION RXN BLOOD BANK NOTIFICATION: NORMAL
TRANSFUSION STATUS PATIENT QL: NORMAL
TRIGL SERPL-MCNC: ABNORMAL MG/DL
TRIGL SERPL-MCNC: NORMAL MG/DL
TRIGL SERPL-MCNC: NORMAL MG/DL
TRIMIPRAMINE QUAL: NEGATIVE
TROPONIN I SERPL-MCNC: 0.03 UG/L (ref 0–0.04)
TSH SERPL DL<=0.005 MIU/L-ACNC: 0.03 MU/L (ref 0.4–4)
TSH SERPL DL<=0.005 MIU/L-ACNC: 0.1 MU/L (ref 0.4–4)
TSH SERPL DL<=0.005 MIU/L-ACNC: 0.1 MU/L (ref 0.4–4)
TYBAMATE QUAL: NEGATIVE
UROBILINOGEN UR STRIP-MCNC: NORMAL MG/DL (ref 0–2)
V CHOL+PARA RFBL+TRKH+TNAA STL QL NAA+PR: NOT DETECTED
VALPROIC ACID QUAL: NEGATIVE
VANCOMYCIN SERPL-MCNC: 14.6 MG/L
VENLAFAXINE QUAL: NEGATIVE
VITAMIN D2 SERPL-MCNC: <5 UG/L
VITAMIN D3 SERPL-MCNC: <5 UG/L
WBC # BLD AUTO: 0.5 10E9/L (ref 4–11)
WBC # BLD AUTO: 0.6 10E9/L (ref 4–11)
WBC # BLD AUTO: 0.6 10E9/L (ref 4–11)
WBC # BLD AUTO: 0.8 10E9/L (ref 4–11)
WBC # BLD AUTO: 0.9 10E9/L (ref 4–11)
WBC # BLD AUTO: 1.1 10E9/L (ref 4–11)
WBC # BLD AUTO: 1.2 10E9/L (ref 4–11)
WBC # BLD AUTO: 1.3 10E9/L (ref 4–11)
WBC # BLD AUTO: 1.3 10E9/L (ref 4–11)
WBC # BLD AUTO: 1.7 10E9/L (ref 4–11)
WBC # BLD AUTO: 2.1 10E9/L (ref 4–11)
WBC # BLD AUTO: 2.5 10E9/L (ref 4–11)
WBC # FLD AUTO: 45 /UL
WBC #/AREA URNS AUTO: 10 /HPF (ref 0–5)
Y ENTERO RECN STL QL NAA+PROBE: NOT DETECTED

## 2020-01-01 PROCEDURE — 86923 COMPATIBILITY TEST ELECTRIC: CPT | Performed by: PATHOLOGY

## 2020-01-01 PROCEDURE — 87633 RESP VIRUS 12-25 TARGETS: CPT | Performed by: INTERNAL MEDICINE

## 2020-01-01 PROCEDURE — 82330 ASSAY OF CALCIUM: CPT

## 2020-01-01 PROCEDURE — 86850 RBC ANTIBODY SCREEN: CPT | Performed by: HOSPITALIST

## 2020-01-01 PROCEDURE — 85610 PROTHROMBIN TIME: CPT | Performed by: HOSPITALIST

## 2020-01-01 PROCEDURE — 87529 HSV DNA AMP PROBE: CPT

## 2020-01-01 PROCEDURE — 80299 QUANTITATIVE ASSAY DRUG: CPT | Performed by: EMERGENCY MEDICINE

## 2020-01-01 PROCEDURE — 80053 COMPREHEN METABOLIC PANEL: CPT

## 2020-01-01 PROCEDURE — 80048 BASIC METABOLIC PNL TOTAL CA: CPT | Performed by: STUDENT IN AN ORGANIZED HEALTH CARE EDUCATION/TRAINING PROGRAM

## 2020-01-01 PROCEDURE — 85610 PROTHROMBIN TIME: CPT | Performed by: INTERNAL MEDICINE

## 2020-01-01 PROCEDURE — 40000344 ZZHCL STATISTIC THAWING COMPONENT: Performed by: STUDENT IN AN ORGANIZED HEALTH CARE EDUCATION/TRAINING PROGRAM

## 2020-01-01 PROCEDURE — 85025 COMPLETE CBC W/AUTO DIFF WBC: CPT | Performed by: STUDENT IN AN ORGANIZED HEALTH CARE EDUCATION/TRAINING PROGRAM

## 2020-01-01 PROCEDURE — 85384 FIBRINOGEN ACTIVITY: CPT | Performed by: INTERNAL MEDICINE

## 2020-01-01 PROCEDURE — 36569 INSJ PICC 5 YR+ W/O IMAGING: CPT

## 2020-01-01 PROCEDURE — 87389 HIV-1 AG W/HIV-1&-2 AB AG IA: CPT

## 2020-01-01 PROCEDURE — 99238 HOSP IP/OBS DSCHRG MGMT 30/<: CPT | Mod: GC | Performed by: INTERNAL MEDICINE

## 2020-01-01 PROCEDURE — 25800030 ZZH RX IP 258 OP 636: Performed by: EMERGENCY MEDICINE

## 2020-01-01 PROCEDURE — 94640 AIRWAY INHALATION TREATMENT: CPT

## 2020-01-01 PROCEDURE — 85384 FIBRINOGEN ACTIVITY: CPT | Performed by: SURGERY

## 2020-01-01 PROCEDURE — 27211389 ZZ H KIT, 5 FR DL BIOFLO OPEN ENDED PICC

## 2020-01-01 PROCEDURE — 25000128 H RX IP 250 OP 636: Performed by: STUDENT IN AN ORGANIZED HEALTH CARE EDUCATION/TRAINING PROGRAM

## 2020-01-01 PROCEDURE — 74176 CT ABD & PELVIS W/O CONTRAST: CPT

## 2020-01-01 PROCEDURE — 36620 INSERTION CATHETER ARTERY: CPT | Mod: GC | Performed by: INTERNAL MEDICINE

## 2020-01-01 PROCEDURE — 84100 ASSAY OF PHOSPHORUS: CPT | Performed by: INTERNAL MEDICINE

## 2020-01-01 PROCEDURE — 83735 ASSAY OF MAGNESIUM: CPT | Performed by: INTERNAL MEDICINE

## 2020-01-01 PROCEDURE — P9012 CRYOPRECIPITATE EACH UNIT: HCPCS | Performed by: STUDENT IN AN ORGANIZED HEALTH CARE EDUCATION/TRAINING PROGRAM

## 2020-01-01 PROCEDURE — 00000146 ZZHCL STATISTIC GLUCOSE BY METER IP

## 2020-01-01 PROCEDURE — 40000501 ZZHCL STATISTIC HEMATOCRIT ED POCT

## 2020-01-01 PROCEDURE — 84100 ASSAY OF PHOSPHORUS: CPT

## 2020-01-01 PROCEDURE — 27211414 ZZ H ADHESIVE SKIN CLOSURE, DERMABOND

## 2020-01-01 PROCEDURE — 85384 FIBRINOGEN ACTIVITY: CPT | Performed by: STUDENT IN AN ORGANIZED HEALTH CARE EDUCATION/TRAINING PROGRAM

## 2020-01-01 PROCEDURE — 0B9D8ZX DRAINAGE OF RIGHT MIDDLE LUNG LOBE, VIA NATURAL OR ARTIFICIAL OPENING ENDOSCOPIC, DIAGNOSTIC: ICD-10-PCS | Performed by: INTERNAL MEDICINE

## 2020-01-01 PROCEDURE — 25800025 ZZH RX 258

## 2020-01-01 PROCEDURE — 82107 ALPHA-FETOPROTEIN L3: CPT | Performed by: STUDENT IN AN ORGANIZED HEALTH CARE EDUCATION/TRAINING PROGRAM

## 2020-01-01 PROCEDURE — 40000014 ZZH STATISTIC ARTERIAL MONITORING DAILY

## 2020-01-01 PROCEDURE — 87305 ASPERGILLUS AG IA: CPT | Performed by: INTERNAL MEDICINE

## 2020-01-01 PROCEDURE — 82803 BLOOD GASES ANY COMBINATION: CPT | Performed by: STUDENT IN AN ORGANIZED HEALTH CARE EDUCATION/TRAINING PROGRAM

## 2020-01-01 PROCEDURE — 25000125 ZZHC RX 250: Performed by: STUDENT IN AN ORGANIZED HEALTH CARE EDUCATION/TRAINING PROGRAM

## 2020-01-01 PROCEDURE — 82390 ASSAY OF CERULOPLASMIN: CPT

## 2020-01-01 PROCEDURE — 85384 FIBRINOGEN ACTIVITY: CPT

## 2020-01-01 PROCEDURE — 80320 DRUG SCREEN QUANTALCOHOLS: CPT | Performed by: STUDENT IN AN ORGANIZED HEALTH CARE EDUCATION/TRAINING PROGRAM

## 2020-01-01 PROCEDURE — 80307 DRUG TEST PRSMV CHEM ANLYZR: CPT

## 2020-01-01 PROCEDURE — 86665 EPSTEIN-BARR CAPSID VCA: CPT | Performed by: STUDENT IN AN ORGANIZED HEALTH CARE EDUCATION/TRAINING PROGRAM

## 2020-01-01 PROCEDURE — 82803 BLOOD GASES ANY COMBINATION: CPT

## 2020-01-01 PROCEDURE — 96365 THER/PROPH/DIAG IV INF INIT: CPT | Performed by: EMERGENCY MEDICINE

## 2020-01-01 PROCEDURE — 5A1D90Z PERFORMANCE OF URINARY FILTRATION, CONTINUOUS, GREATER THAN 18 HOURS PER DAY: ICD-10-PCS | Performed by: INTERNAL MEDICINE

## 2020-01-01 PROCEDURE — 93005 ELECTROCARDIOGRAM TRACING: CPT

## 2020-01-01 PROCEDURE — 82306 VITAMIN D 25 HYDROXY: CPT | Performed by: INTERNAL MEDICINE

## 2020-01-01 PROCEDURE — 80321 ALCOHOLS BIOMARKERS 1OR 2: CPT | Performed by: HOSPITALIST

## 2020-01-01 PROCEDURE — 85730 THROMBOPLASTIN TIME PARTIAL: CPT | Performed by: STUDENT IN AN ORGANIZED HEALTH CARE EDUCATION/TRAINING PROGRAM

## 2020-01-01 PROCEDURE — 85045 AUTOMATED RETICULOCYTE COUNT: CPT | Performed by: STUDENT IN AN ORGANIZED HEALTH CARE EDUCATION/TRAINING PROGRAM

## 2020-01-01 PROCEDURE — 27210136 ZZH KIT CATH ARTERIAL EXT SUPPLY

## 2020-01-01 PROCEDURE — 00000328 ZZHCL STATISTIC PTT NC: Performed by: INTERNAL MEDICINE

## 2020-01-01 PROCEDURE — 25800030 ZZH RX IP 258 OP 636: Performed by: STUDENT IN AN ORGANIZED HEALTH CARE EDUCATION/TRAINING PROGRAM

## 2020-01-01 PROCEDURE — 85027 COMPLETE CBC AUTOMATED: CPT

## 2020-01-01 PROCEDURE — 27211040 ZZH CONTINUOUS NEBULIZER MICRO PUMP

## 2020-01-01 PROCEDURE — P9016 RBC LEUKOCYTES REDUCED: HCPCS | Performed by: HOSPITALIST

## 2020-01-01 PROCEDURE — 85027 COMPLETE CBC AUTOMATED: CPT | Performed by: HOSPITALIST

## 2020-01-01 PROCEDURE — 94644 CONT INHLJ TX 1ST HOUR: CPT

## 2020-01-01 PROCEDURE — 25000125 ZZHC RX 250

## 2020-01-01 PROCEDURE — 83930 ASSAY OF BLOOD OSMOLALITY: CPT | Performed by: HOSPITALIST

## 2020-01-01 PROCEDURE — 83605 ASSAY OF LACTIC ACID: CPT

## 2020-01-01 PROCEDURE — 85220 BLOOC CLOT FACTOR V TEST: CPT | Performed by: INTERNAL MEDICINE

## 2020-01-01 PROCEDURE — P9059 PLASMA, FRZ BETWEEN 8-24HOUR: HCPCS | Performed by: STUDENT IN AN ORGANIZED HEALTH CARE EDUCATION/TRAINING PROGRAM

## 2020-01-01 PROCEDURE — 86850 RBC ANTIBODY SCREEN: CPT | Performed by: EMERGENCY MEDICINE

## 2020-01-01 PROCEDURE — 82550 ASSAY OF CK (CPK): CPT | Performed by: EMERGENCY MEDICINE

## 2020-01-01 PROCEDURE — 99291 CRITICAL CARE FIRST HOUR: CPT | Mod: 25 | Performed by: EMERGENCY MEDICINE

## 2020-01-01 PROCEDURE — 87521 HEPATITIS C PROBE&RVRS TRNSC: CPT | Performed by: STUDENT IN AN ORGANIZED HEALTH CARE EDUCATION/TRAINING PROGRAM

## 2020-01-01 PROCEDURE — 25800030 ZZH RX IP 258 OP 636

## 2020-01-01 PROCEDURE — 3E043XZ INTRODUCTION OF VASOPRESSOR INTO CENTRAL VEIN, PERCUTANEOUS APPROACH: ICD-10-PCS | Performed by: INTERNAL MEDICINE

## 2020-01-01 PROCEDURE — 83540 ASSAY OF IRON: CPT

## 2020-01-01 PROCEDURE — 83605 ASSAY OF LACTIC ACID: CPT | Performed by: STUDENT IN AN ORGANIZED HEALTH CARE EDUCATION/TRAINING PROGRAM

## 2020-01-01 PROCEDURE — 85610 PROTHROMBIN TIME: CPT

## 2020-01-01 PROCEDURE — 80329 ANALGESICS NON-OPIOID 1 OR 2: CPT | Performed by: INTERNAL MEDICINE

## 2020-01-01 PROCEDURE — 86692 HEPATITIS DELTA AGENT ANTBDY: CPT | Performed by: STUDENT IN AN ORGANIZED HEALTH CARE EDUCATION/TRAINING PROGRAM

## 2020-01-01 PROCEDURE — 83718 ASSAY OF LIPOPROTEIN: CPT

## 2020-01-01 PROCEDURE — C9113 INJ PANTOPRAZOLE SODIUM, VIA: HCPCS

## 2020-01-01 PROCEDURE — 86790 VIRUS ANTIBODY NOS: CPT

## 2020-01-01 PROCEDURE — 86780 TREPONEMA PALLIDUM: CPT | Performed by: STUDENT IN AN ORGANIZED HEALTH CARE EDUCATION/TRAINING PROGRAM

## 2020-01-01 PROCEDURE — 96375 TX/PRO/DX INJ NEW DRUG ADDON: CPT | Performed by: EMERGENCY MEDICINE

## 2020-01-01 PROCEDURE — 93010 ELECTROCARDIOGRAM REPORT: CPT | Mod: 59 | Performed by: INTERNAL MEDICINE

## 2020-01-01 PROCEDURE — 86708 HEPATITIS A ANTIBODY: CPT

## 2020-01-01 PROCEDURE — 87107 FUNGI IDENTIFICATION MOLD: CPT | Performed by: INTERNAL MEDICINE

## 2020-01-01 PROCEDURE — 82330 ASSAY OF CALCIUM: CPT | Performed by: INTERNAL MEDICINE

## 2020-01-01 PROCEDURE — 86706 HEP B SURFACE ANTIBODY: CPT

## 2020-01-01 PROCEDURE — 86682 HELMINTH ANTIBODY: CPT

## 2020-01-01 PROCEDURE — 40000986 XR CHEST PORT 1 VW

## 2020-01-01 PROCEDURE — 94645 CONT INHLJ TX EACH ADDL HOUR: CPT

## 2020-01-01 PROCEDURE — 84999 UNLISTED CHEMISTRY PROCEDURE: CPT | Performed by: STUDENT IN AN ORGANIZED HEALTH CARE EDUCATION/TRAINING PROGRAM

## 2020-01-01 PROCEDURE — 99291 CRITICAL CARE FIRST HOUR: CPT | Mod: 25 | Performed by: INTERNAL MEDICINE

## 2020-01-01 PROCEDURE — 93306 TTE W/DOPPLER COMPLETE: CPT | Mod: 26 | Performed by: INTERNAL MEDICINE

## 2020-01-01 PROCEDURE — 27211391 ZZ H KIT, 6 FR TL BIOFLO OPEN ENDED PICC

## 2020-01-01 PROCEDURE — 82103 ALPHA-1-ANTITRYPSIN TOTAL: CPT

## 2020-01-01 PROCEDURE — 25800025 ZZH RX 258: Performed by: STUDENT IN AN ORGANIZED HEALTH CARE EDUCATION/TRAINING PROGRAM

## 2020-01-01 PROCEDURE — 31624 DX BRONCHOSCOPE/LAVAGE: CPT

## 2020-01-01 PROCEDURE — 20000004 ZZH R&B ICU UMMC

## 2020-01-01 PROCEDURE — 87389 HIV-1 AG W/HIV-1&-2 AB AG IA: CPT | Performed by: STUDENT IN AN ORGANIZED HEALTH CARE EDUCATION/TRAINING PROGRAM

## 2020-01-01 PROCEDURE — C9803 HOPD COVID-19 SPEC COLLECT: HCPCS | Performed by: EMERGENCY MEDICINE

## 2020-01-01 PROCEDURE — 87106 FUNGI IDENTIFICATION YEAST: CPT | Performed by: INTERNAL MEDICINE

## 2020-01-01 PROCEDURE — 86923 COMPATIBILITY TEST ELECTRIC: CPT | Performed by: EMERGENCY MEDICINE

## 2020-01-01 PROCEDURE — 25800030 ZZH RX IP 258 OP 636: Performed by: INTERNAL MEDICINE

## 2020-01-01 PROCEDURE — 85027 COMPLETE CBC AUTOMATED: CPT | Performed by: INTERNAL MEDICINE

## 2020-01-01 PROCEDURE — 85240 CLOT FACTOR VIII AHG 1 STAGE: CPT | Performed by: INTERNAL MEDICINE

## 2020-01-01 PROCEDURE — 25000125 ZZHC RX 250: Performed by: NURSE PRACTITIONER

## 2020-01-01 PROCEDURE — 83615 LACTATE (LD) (LDH) ENZYME: CPT | Performed by: STUDENT IN AN ORGANIZED HEALTH CARE EDUCATION/TRAINING PROGRAM

## 2020-01-01 PROCEDURE — 82247 BILIRUBIN TOTAL: CPT

## 2020-01-01 PROCEDURE — 90947 DIALYSIS REPEATED EVAL: CPT

## 2020-01-01 PROCEDURE — 87040 BLOOD CULTURE FOR BACTERIA: CPT

## 2020-01-01 PROCEDURE — 25000125 ZZHC RX 250: Performed by: INTERNAL MEDICINE

## 2020-01-01 PROCEDURE — 40000611 ZZHCL STATISTIC MORPHOLOGY W/INTERP HEMEPATH TC 85060: Performed by: STUDENT IN AN ORGANIZED HEALTH CARE EDUCATION/TRAINING PROGRAM

## 2020-01-01 PROCEDURE — 31624 DX BRONCHOSCOPE/LAVAGE: CPT | Mod: GC | Performed by: INTERNAL MEDICINE

## 2020-01-01 PROCEDURE — 87102 FUNGUS ISOLATION CULTURE: CPT | Performed by: INTERNAL MEDICINE

## 2020-01-01 PROCEDURE — 25000128 H RX IP 250 OP 636

## 2020-01-01 PROCEDURE — 40000502 ZZHCL STATISTIC GLUCOSE ED POCT

## 2020-01-01 PROCEDURE — 82652 VIT D 1 25-DIHYDROXY: CPT | Performed by: INTERNAL MEDICINE

## 2020-01-01 PROCEDURE — 93005 ELECTROCARDIOGRAM TRACING: CPT | Performed by: EMERGENCY MEDICINE

## 2020-01-01 PROCEDURE — 36430 TRANSFUSION BLD/BLD COMPNT: CPT | Performed by: EMERGENCY MEDICINE

## 2020-01-01 PROCEDURE — 87086 URINE CULTURE/COLONY COUNT: CPT

## 2020-01-01 PROCEDURE — 83735 ASSAY OF MAGNESIUM: CPT | Performed by: HOSPITALIST

## 2020-01-01 PROCEDURE — 99292 CRITICAL CARE ADDL 30 MIN: CPT | Mod: 25 | Performed by: EMERGENCY MEDICINE

## 2020-01-01 PROCEDURE — 93010 ELECTROCARDIOGRAM REPORT: CPT | Mod: Z6 | Performed by: EMERGENCY MEDICINE

## 2020-01-01 PROCEDURE — 85025 COMPLETE CBC W/AUTO DIFF WBC: CPT

## 2020-01-01 PROCEDURE — P9037 PLATE PHERES LEUKOREDU IRRAD: HCPCS | Performed by: STUDENT IN AN ORGANIZED HEALTH CARE EDUCATION/TRAINING PROGRAM

## 2020-01-01 PROCEDURE — 87205 SMEAR GRAM STAIN: CPT | Performed by: INTERNAL MEDICINE

## 2020-01-01 PROCEDURE — 82570 ASSAY OF URINE CREATININE: CPT

## 2020-01-01 PROCEDURE — 82595 ASSAY OF CRYOGLOBULIN: CPT | Performed by: STUDENT IN AN ORGANIZED HEALTH CARE EDUCATION/TRAINING PROGRAM

## 2020-01-01 PROCEDURE — 86644 CMV ANTIBODY: CPT | Performed by: STUDENT IN AN ORGANIZED HEALTH CARE EDUCATION/TRAINING PROGRAM

## 2020-01-01 PROCEDURE — 40000341 ZZHCL STATISTIC BB TRANSF RXN INVEST: Performed by: PATHOLOGY

## 2020-01-01 PROCEDURE — 25000128 H RX IP 250 OP 636: Performed by: INTERNAL MEDICINE

## 2020-01-01 PROCEDURE — P9012 CRYOPRECIPITATE EACH UNIT: HCPCS

## 2020-01-01 PROCEDURE — 87799 DETECT AGENT NOS DNA QUANT: CPT | Performed by: STUDENT IN AN ORGANIZED HEALTH CARE EDUCATION/TRAINING PROGRAM

## 2020-01-01 PROCEDURE — 40000281 ZZH STATISTIC TRANSPORT TIME EA 15 MIN

## 2020-01-01 PROCEDURE — 87535 HIV-1 PROBE&REVERSE TRNSCRPJ: CPT | Performed by: STUDENT IN AN ORGANIZED HEALTH CARE EDUCATION/TRAINING PROGRAM

## 2020-01-01 PROCEDURE — 84300 ASSAY OF URINE SODIUM: CPT

## 2020-01-01 PROCEDURE — 84484 ASSAY OF TROPONIN QUANT: CPT | Performed by: EMERGENCY MEDICINE

## 2020-01-01 PROCEDURE — 40000341 ZZHCL STATISTIC BB TRANSF RXN INVEST

## 2020-01-01 PROCEDURE — 85004 AUTOMATED DIFF WBC COUNT: CPT

## 2020-01-01 PROCEDURE — 86757 RICKETTSIA ANTIBODY: CPT | Performed by: STUDENT IN AN ORGANIZED HEALTH CARE EDUCATION/TRAINING PROGRAM

## 2020-01-01 PROCEDURE — 40000358 ZZHCL STATISTIC DRUG SCREEN MULTIPLE (METRO)

## 2020-01-01 PROCEDURE — 85730 THROMBOPLASTIN TIME PARTIAL: CPT | Performed by: EMERGENCY MEDICINE

## 2020-01-01 PROCEDURE — 86709 HEPATITIS A IGM ANTIBODY: CPT

## 2020-01-01 PROCEDURE — 86682 HELMINTH ANTIBODY: CPT | Performed by: INTERNAL MEDICINE

## 2020-01-01 PROCEDURE — 86780 TREPONEMA PALLIDUM: CPT

## 2020-01-01 PROCEDURE — 40000497 ZZHCL STATISTIC SODIUM ED POCT

## 2020-01-01 PROCEDURE — 80202 ASSAY OF VANCOMYCIN: CPT | Performed by: STUDENT IN AN ORGANIZED HEALTH CARE EDUCATION/TRAINING PROGRAM

## 2020-01-01 PROCEDURE — 87385 HISTOPLASMA CAPSUL AG IA: CPT | Performed by: STUDENT IN AN ORGANIZED HEALTH CARE EDUCATION/TRAINING PROGRAM

## 2020-01-01 PROCEDURE — 82140 ASSAY OF AMMONIA: CPT | Performed by: STUDENT IN AN ORGANIZED HEALTH CARE EDUCATION/TRAINING PROGRAM

## 2020-01-01 PROCEDURE — 86900 BLOOD TYPING SEROLOGIC ABO: CPT | Performed by: HOSPITALIST

## 2020-01-01 PROCEDURE — 85610 PROTHROMBIN TIME: CPT | Performed by: STUDENT IN AN ORGANIZED HEALTH CARE EDUCATION/TRAINING PROGRAM

## 2020-01-01 PROCEDURE — 5A1945Z RESPIRATORY VENTILATION, 24-96 CONSECUTIVE HOURS: ICD-10-PCS | Performed by: INTERNAL MEDICINE

## 2020-01-01 PROCEDURE — 84100 ASSAY OF PHOSPHORUS: CPT | Performed by: STUDENT IN AN ORGANIZED HEALTH CARE EDUCATION/TRAINING PROGRAM

## 2020-01-01 PROCEDURE — 84439 ASSAY OF FREE THYROXINE: CPT

## 2020-01-01 PROCEDURE — 86705 HEP B CORE ANTIBODY IGM: CPT

## 2020-01-01 PROCEDURE — 87506 IADNA-DNA/RNA PROBE TQ 6-11: CPT

## 2020-01-01 PROCEDURE — 40000275 ZZH STATISTIC RCP TIME EA 10 MIN

## 2020-01-01 PROCEDURE — P9047 ALBUMIN (HUMAN), 25%, 50ML: HCPCS

## 2020-01-01 PROCEDURE — 80329 ANALGESICS NON-OPIOID 1 OR 2: CPT | Performed by: EMERGENCY MEDICINE

## 2020-01-01 PROCEDURE — 25000132 ZZH RX MED GY IP 250 OP 250 PS 637: Performed by: STUDENT IN AN ORGANIZED HEALTH CARE EDUCATION/TRAINING PROGRAM

## 2020-01-01 PROCEDURE — 87040 BLOOD CULTURE FOR BACTERIA: CPT | Performed by: EMERGENCY MEDICINE

## 2020-01-01 PROCEDURE — 94003 VENT MGMT INPAT SUBQ DAY: CPT

## 2020-01-01 PROCEDURE — 99232 SBSQ HOSP IP/OBS MODERATE 35: CPT | Mod: GC | Performed by: INTERNAL MEDICINE

## 2020-01-01 PROCEDURE — 93975 VASCULAR STUDY: CPT | Mod: TC

## 2020-01-01 PROCEDURE — 00000167 ZZHCL STATISTIC INR NC: Performed by: INTERNAL MEDICINE

## 2020-01-01 PROCEDURE — 87305 ASPERGILLUS AG IA: CPT | Performed by: STUDENT IN AN ORGANIZED HEALTH CARE EDUCATION/TRAINING PROGRAM

## 2020-01-01 PROCEDURE — 25000125 ZZHC RX 250: Performed by: EMERGENCY MEDICINE

## 2020-01-01 PROCEDURE — 82810 BLOOD GASES O2 SAT ONLY: CPT | Performed by: STUDENT IN AN ORGANIZED HEALTH CARE EDUCATION/TRAINING PROGRAM

## 2020-01-01 PROCEDURE — 86038 ANTINUCLEAR ANTIBODIES: CPT

## 2020-01-01 PROCEDURE — 83516 IMMUNOASSAY NONANTIBODY: CPT

## 2020-01-01 PROCEDURE — 27210577 ZZ H INTRODUCER MICRO SET

## 2020-01-01 PROCEDURE — 87340 HEPATITIS B SURFACE AG IA: CPT

## 2020-01-01 PROCEDURE — 76705 ECHO EXAM OF ABDOMEN: CPT

## 2020-01-01 PROCEDURE — 86753 PROTOZOA ANTIBODY NOS: CPT | Performed by: INTERNAL MEDICINE

## 2020-01-01 PROCEDURE — 36415 COLL VENOUS BLD VENIPUNCTURE: CPT

## 2020-01-01 PROCEDURE — 82728 ASSAY OF FERRITIN: CPT

## 2020-01-01 PROCEDURE — 84154 ASSAY OF PSA FREE: CPT | Performed by: STUDENT IN AN ORGANIZED HEALTH CARE EDUCATION/TRAINING PROGRAM

## 2020-01-01 PROCEDURE — 40000498 ZZHCL STATISTIC POTASSIUM ED POCT

## 2020-01-01 PROCEDURE — 86901 BLOOD TYPING SEROLOGIC RH(D): CPT | Performed by: EMERGENCY MEDICINE

## 2020-01-01 PROCEDURE — 86923 COMPATIBILITY TEST ELECTRIC: CPT | Performed by: HOSPITALIST

## 2020-01-01 PROCEDURE — 87516 HEPATITIS B DNA AMP PROBE: CPT | Performed by: STUDENT IN AN ORGANIZED HEALTH CARE EDUCATION/TRAINING PROGRAM

## 2020-01-01 PROCEDURE — 94002 VENT MGMT INPAT INIT DAY: CPT

## 2020-01-01 PROCEDURE — 71045 X-RAY EXAM CHEST 1 VIEW: CPT

## 2020-01-01 PROCEDURE — 80048 BASIC METABOLIC PNL TOTAL CA: CPT | Performed by: INTERNAL MEDICINE

## 2020-01-01 PROCEDURE — 83935 ASSAY OF URINE OSMOLALITY: CPT

## 2020-01-01 PROCEDURE — 82330 ASSAY OF CALCIUM: CPT | Performed by: STUDENT IN AN ORGANIZED HEALTH CARE EDUCATION/TRAINING PROGRAM

## 2020-01-01 PROCEDURE — 83735 ASSAY OF MAGNESIUM: CPT

## 2020-01-01 PROCEDURE — 84478 ASSAY OF TRIGLYCERIDES: CPT | Performed by: STUDENT IN AN ORGANIZED HEALTH CARE EDUCATION/TRAINING PROGRAM

## 2020-01-01 PROCEDURE — 87081 CULTURE SCREEN ONLY: CPT | Performed by: INTERNAL MEDICINE

## 2020-01-01 PROCEDURE — 36415 COLL VENOUS BLD VENIPUNCTURE: CPT | Performed by: STUDENT IN AN ORGANIZED HEALTH CARE EDUCATION/TRAINING PROGRAM

## 2020-01-01 PROCEDURE — 87103 BLOOD FUNGUS CULTURE: CPT | Performed by: INTERNAL MEDICINE

## 2020-01-01 PROCEDURE — 36600 WITHDRAWAL OF ARTERIAL BLOOD: CPT

## 2020-01-01 PROCEDURE — 40000556 ZZH STATISTIC PERIPHERAL IV START W US GUIDANCE

## 2020-01-01 PROCEDURE — 83550 IRON BINDING TEST: CPT

## 2020-01-01 PROCEDURE — 84443 ASSAY THYROID STIM HORMONE: CPT | Performed by: STUDENT IN AN ORGANIZED HEALTH CARE EDUCATION/TRAINING PROGRAM

## 2020-01-01 PROCEDURE — 83550 IRON BINDING TEST: CPT | Performed by: STUDENT IN AN ORGANIZED HEALTH CARE EDUCATION/TRAINING PROGRAM

## 2020-01-01 PROCEDURE — 87206 SMEAR FLUORESCENT/ACID STAI: CPT | Performed by: INTERNAL MEDICINE

## 2020-01-01 PROCEDURE — 87799 DETECT AGENT NOS DNA QUANT: CPT

## 2020-01-01 PROCEDURE — P9016 RBC LEUKOCYTES REDUCED: HCPCS | Performed by: EMERGENCY MEDICINE

## 2020-01-01 PROCEDURE — 80299 QUANTITATIVE ASSAY DRUG: CPT | Performed by: INTERNAL MEDICINE

## 2020-01-01 PROCEDURE — 82805 BLOOD GASES W/O2 SATURATION: CPT | Performed by: INTERNAL MEDICINE

## 2020-01-01 PROCEDURE — 82248 BILIRUBIN DIRECT: CPT

## 2020-01-01 PROCEDURE — C9113 INJ PANTOPRAZOLE SODIUM, VIA: HCPCS | Performed by: INTERNAL MEDICINE

## 2020-01-01 PROCEDURE — 80048 BASIC METABOLIC PNL TOTAL CA: CPT

## 2020-01-01 PROCEDURE — 86790 VIRUS ANTIBODY NOS: CPT | Performed by: STUDENT IN AN ORGANIZED HEALTH CARE EDUCATION/TRAINING PROGRAM

## 2020-01-01 PROCEDURE — 84443 ASSAY THYROID STIM HORMONE: CPT

## 2020-01-01 PROCEDURE — 85384 FIBRINOGEN ACTIVITY: CPT | Performed by: HOSPITALIST

## 2020-01-01 PROCEDURE — 83605 ASSAY OF LACTIC ACID: CPT | Performed by: EMERGENCY MEDICINE

## 2020-01-01 PROCEDURE — 84439 ASSAY OF FREE THYROXINE: CPT | Performed by: STUDENT IN AN ORGANIZED HEALTH CARE EDUCATION/TRAINING PROGRAM

## 2020-01-01 PROCEDURE — 31500 INSERT EMERGENCY AIRWAY: CPT | Performed by: INTERNAL MEDICINE

## 2020-01-01 PROCEDURE — 40000344 ZZHCL STATISTIC THAWING COMPONENT

## 2020-01-01 PROCEDURE — 80076 HEPATIC FUNCTION PANEL: CPT | Performed by: STUDENT IN AN ORGANIZED HEALTH CARE EDUCATION/TRAINING PROGRAM

## 2020-01-01 PROCEDURE — 86803 HEPATITIS C AB TEST: CPT

## 2020-01-01 PROCEDURE — 86900 BLOOD TYPING SEROLOGIC ABO: CPT | Performed by: EMERGENCY MEDICINE

## 2020-01-01 PROCEDURE — XW043H4 INTRODUCTION OF SYNTHETIC HUMAN ANGIOTENSIN II INTO CENTRAL VEIN, PERCUTANEOUS APPROACH, NEW TECHNOLOGY GROUP 4: ICD-10-PCS | Performed by: INTERNAL MEDICINE

## 2020-01-01 PROCEDURE — 80048 BASIC METABOLIC PNL TOTAL CA: CPT | Performed by: EMERGENCY MEDICINE

## 2020-01-01 PROCEDURE — 85027 COMPLETE CBC AUTOMATED: CPT | Performed by: STUDENT IN AN ORGANIZED HEALTH CARE EDUCATION/TRAINING PROGRAM

## 2020-01-01 PROCEDURE — 86704 HEP B CORE ANTIBODY TOTAL: CPT

## 2020-01-01 PROCEDURE — 85730 THROMBOPLASTIN TIME PARTIAL: CPT | Performed by: HOSPITALIST

## 2020-01-01 PROCEDURE — 99222 1ST HOSP IP/OBS MODERATE 55: CPT | Mod: GC | Performed by: INTERNAL MEDICINE

## 2020-01-01 PROCEDURE — 84100 ASSAY OF PHOSPHORUS: CPT | Performed by: HOSPITALIST

## 2020-01-01 PROCEDURE — 88312 SPECIAL STAINS GROUP 1: CPT | Performed by: INTERNAL MEDICINE

## 2020-01-01 PROCEDURE — 82104 ALPHA-1-ANTITRYPSIN PHENO: CPT

## 2020-01-01 PROCEDURE — 87449 NOS EACH ORGANISM AG IA: CPT | Performed by: STUDENT IN AN ORGANIZED HEALTH CARE EDUCATION/TRAINING PROGRAM

## 2020-01-01 PROCEDURE — U0003 INFECTIOUS AGENT DETECTION BY NUCLEIC ACID (DNA OR RNA); SEVERE ACUTE RESPIRATORY SYNDROME CORONAVIRUS 2 (SARS-COV-2) (CORONAVIRUS DISEASE [COVID-19]), AMPLIFIED PROBE TECHNIQUE, MAKING USE OF HIGH THROUGHPUT TECHNOLOGIES AS DESCRIBED BY CMS-2020-01-R: HCPCS | Performed by: EMERGENCY MEDICINE

## 2020-01-01 PROCEDURE — 87581 M.PNEUMON DNA AMP PROBE: CPT | Performed by: INTERNAL MEDICINE

## 2020-01-01 PROCEDURE — 82140 ASSAY OF AMMONIA: CPT

## 2020-01-01 PROCEDURE — 80307 DRUG TEST PRSMV CHEM ANLYZR: CPT | Performed by: HOSPITALIST

## 2020-01-01 PROCEDURE — 27211417 ZZ H KIT, VPS RHYTHM STYLET

## 2020-01-01 PROCEDURE — 25000128 H RX IP 250 OP 636: Performed by: EMERGENCY MEDICINE

## 2020-01-01 PROCEDURE — 87493 C DIFF AMPLIFIED PROBE: CPT

## 2020-01-01 PROCEDURE — 82550 ASSAY OF CK (CPK): CPT | Performed by: HOSPITALIST

## 2020-01-01 PROCEDURE — 84153 ASSAY OF PSA TOTAL: CPT | Performed by: STUDENT IN AN ORGANIZED HEALTH CARE EDUCATION/TRAINING PROGRAM

## 2020-01-01 PROCEDURE — 87116 MYCOBACTERIA CULTURE: CPT | Performed by: INTERNAL MEDICINE

## 2020-01-01 PROCEDURE — 86720 LEPTOSPIRA ANTIBODY: CPT

## 2020-01-01 PROCEDURE — 27211393 ZZH DRESSING, STATSEAL DISC

## 2020-01-01 PROCEDURE — 93306 TTE W/DOPPLER COMPLETE: CPT

## 2020-01-01 PROCEDURE — 84999 UNLISTED CHEMISTRY PROCEDURE: CPT | Performed by: INTERNAL MEDICINE

## 2020-01-01 PROCEDURE — 84478 ASSAY OF TRIGLYCERIDES: CPT

## 2020-01-01 PROCEDURE — 00000401 ZZHCL STATISTIC THROMBIN TIME NC: Performed by: INTERNAL MEDICINE

## 2020-01-01 PROCEDURE — 94660 CPAP INITIATION&MGMT: CPT

## 2020-01-01 PROCEDURE — 87070 CULTURE OTHR SPECIMN AEROBIC: CPT | Performed by: INTERNAL MEDICINE

## 2020-01-01 PROCEDURE — 85610 PROTHROMBIN TIME: CPT | Performed by: EMERGENCY MEDICINE

## 2020-01-01 PROCEDURE — 36556 INSERT NON-TUNNEL CV CATH: CPT | Mod: RT | Performed by: INTERNAL MEDICINE

## 2020-01-01 PROCEDURE — 83690 ASSAY OF LIPASE: CPT

## 2020-01-01 PROCEDURE — 81001 URINALYSIS AUTO W/SCOPE: CPT

## 2020-01-01 PROCEDURE — 87486 CHLMYD PNEUM DNA AMP PROBE: CPT | Performed by: INTERNAL MEDICINE

## 2020-01-01 PROCEDURE — 86635 COCCIDIOIDES ANTIBODY: CPT | Performed by: STUDENT IN AN ORGANIZED HEALTH CARE EDUCATION/TRAINING PROGRAM

## 2020-01-01 PROCEDURE — 86481 TB AG RESPONSE T-CELL SUSP: CPT | Performed by: INTERNAL MEDICINE

## 2020-01-01 PROCEDURE — 70450 CT HEAD/BRAIN W/O DYE: CPT

## 2020-01-01 PROCEDURE — P9059 PLASMA, FRZ BETWEEN 8-24HOUR: HCPCS

## 2020-01-01 PROCEDURE — 25800025 ZZH RX 258: Performed by: EMERGENCY MEDICINE

## 2020-01-01 PROCEDURE — 89051 BODY FLUID CELL COUNT: CPT | Performed by: INTERNAL MEDICINE

## 2020-01-01 PROCEDURE — 85004 AUTOMATED DIFF WBC COUNT: CPT | Performed by: HOSPITALIST

## 2020-01-01 PROCEDURE — P9037 PLATE PHERES LEUKOREDU IRRAD: HCPCS

## 2020-01-01 PROCEDURE — 84999 UNLISTED CHEMISTRY PROCEDURE: CPT | Performed by: EMERGENCY MEDICINE

## 2020-01-01 PROCEDURE — 86666 EHRLICHIA ANTIBODY: CPT | Performed by: STUDENT IN AN ORGANIZED HEALTH CARE EDUCATION/TRAINING PROGRAM

## 2020-01-01 PROCEDURE — 80053 COMPREHEN METABOLIC PANEL: CPT | Performed by: HOSPITALIST

## 2020-01-01 PROCEDURE — 88108 CYTOPATH CONCENTRATE TECH: CPT | Performed by: INTERNAL MEDICINE

## 2020-01-01 PROCEDURE — 87210 SMEAR WET MOUNT SALINE/INK: CPT | Performed by: INTERNAL MEDICINE

## 2020-01-01 PROCEDURE — 86901 BLOOD TYPING SEROLOGIC RH(D): CPT | Performed by: HOSPITALIST

## 2020-01-01 PROCEDURE — 85025 COMPLETE CBC W/AUTO DIFF WBC: CPT | Performed by: EMERGENCY MEDICINE

## 2020-01-01 PROCEDURE — 83540 ASSAY OF IRON: CPT | Performed by: STUDENT IN AN ORGANIZED HEALTH CARE EDUCATION/TRAINING PROGRAM

## 2020-01-01 PROCEDURE — 87798 DETECT AGENT NOS DNA AMP: CPT | Performed by: INTERNAL MEDICINE

## 2020-01-01 PROCEDURE — 80053 COMPREHEN METABOLIC PANEL: CPT | Performed by: EMERGENCY MEDICINE

## 2020-01-01 PROCEDURE — 83930 ASSAY OF BLOOD OSMOLALITY: CPT

## 2020-01-01 PROCEDURE — 83520 IMMUNOASSAY QUANT NOS NONAB: CPT | Performed by: STUDENT IN AN ORGANIZED HEALTH CARE EDUCATION/TRAINING PROGRAM

## 2020-01-01 PROCEDURE — 83970 ASSAY OF PARATHORMONE: CPT | Performed by: INTERNAL MEDICINE

## 2020-01-01 RX ORDER — DOXYCYCLINE 100 MG/10ML
100 INJECTION, POWDER, LYOPHILIZED, FOR SOLUTION INTRAVENOUS EVERY 12 HOURS
Status: DISCONTINUED | OUTPATIENT
Start: 2020-01-01 | End: 2020-01-01 | Stop reason: HOSPADM

## 2020-01-01 RX ORDER — HEPARIN SODIUM,PORCINE 10 UNIT/ML
5 VIAL (ML) INTRAVENOUS
Status: DISCONTINUED | OUTPATIENT
Start: 2020-01-01 | End: 2020-01-01 | Stop reason: HOSPADM

## 2020-01-01 RX ORDER — DEXTROSE 20 G/100ML
INJECTION, SOLUTION INTRAVENOUS CONTINUOUS
Status: DISCONTINUED | OUTPATIENT
Start: 2020-01-01 | End: 2020-01-01

## 2020-01-01 RX ORDER — DIPHENHYDRAMINE HCL 25 MG
25 CAPSULE ORAL EVERY 6 HOURS PRN
Status: DISCONTINUED | OUTPATIENT
Start: 2020-01-01 | End: 2020-01-01 | Stop reason: HOSPADM

## 2020-01-01 RX ORDER — SODIUM CHLORIDE 9 MG/ML
INJECTION, SOLUTION INTRAVENOUS CONTINUOUS
Status: DISCONTINUED | OUTPATIENT
Start: 2020-01-01 | End: 2020-01-01

## 2020-01-01 RX ORDER — IPRATROPIUM BROMIDE AND ALBUTEROL SULFATE 2.5; .5 MG/3ML; MG/3ML
SOLUTION RESPIRATORY (INHALATION)
Status: COMPLETED
Start: 2020-01-01 | End: 2020-01-01

## 2020-01-01 RX ORDER — OXYMETAZOLINE HYDROCHLORIDE 0.05 G/100ML
3 SPRAY NASAL EVERY 4 HOURS
Status: DISCONTINUED | OUTPATIENT
Start: 2020-01-01 | End: 2020-01-01 | Stop reason: HOSPADM

## 2020-01-01 RX ORDER — DIPHENHYDRAMINE HCL 25 MG
25 CAPSULE ORAL EVERY 24 HOURS
Status: DISCONTINUED | OUTPATIENT
Start: 2020-01-01 | End: 2020-01-01 | Stop reason: HOSPADM

## 2020-01-01 RX ORDER — ACETAMINOPHEN 325 MG/1
650 TABLET ORAL EVERY 24 HOURS
Status: DISCONTINUED | OUTPATIENT
Start: 2020-01-01 | End: 2020-01-01 | Stop reason: HOSPADM

## 2020-01-01 RX ORDER — CALCIUM/MAGNESIUM/ZINC 333-133 MG
2 TABLET ORAL EVERY 12 HOURS
Status: DISCONTINUED | OUTPATIENT
Start: 2020-01-01 | End: 2020-01-01

## 2020-01-01 RX ORDER — NALOXONE HYDROCHLORIDE 0.4 MG/ML
.1-.4 INJECTION, SOLUTION INTRAMUSCULAR; INTRAVENOUS; SUBCUTANEOUS
Status: DISCONTINUED | OUTPATIENT
Start: 2020-01-01 | End: 2020-01-01

## 2020-01-01 RX ORDER — NOREPINEPHRINE BITARTRATE 0.06 MG/ML
.03-1 INJECTION, SOLUTION INTRAVENOUS CONTINUOUS
Status: DISCONTINUED | OUTPATIENT
Start: 2020-01-01 | End: 2020-01-01 | Stop reason: HOSPADM

## 2020-01-01 RX ORDER — FENTANYL CITRATE 50 UG/ML
25-50 INJECTION, SOLUTION INTRAMUSCULAR; INTRAVENOUS
Status: DISCONTINUED | OUTPATIENT
Start: 2020-01-01 | End: 2020-01-01

## 2020-01-01 RX ORDER — NALOXONE HYDROCHLORIDE 0.4 MG/ML
.1-.4 INJECTION, SOLUTION INTRAMUSCULAR; INTRAVENOUS; SUBCUTANEOUS
Status: DISCONTINUED | OUTPATIENT
Start: 2020-01-01 | End: 2020-01-01 | Stop reason: HOSPADM

## 2020-01-01 RX ORDER — MAGNESIUM SULFATE HEPTAHYDRATE 40 MG/ML
2 INJECTION, SOLUTION INTRAVENOUS EVERY 6 HOURS PRN
Status: DISCONTINUED | OUTPATIENT
Start: 2020-01-01 | End: 2020-01-01 | Stop reason: HOSPADM

## 2020-01-01 RX ORDER — NOREPINEPHRINE BITARTRATE 0.06 MG/ML
INJECTION, SOLUTION INTRAVENOUS
Status: COMPLETED
Start: 2020-01-01 | End: 2020-01-01

## 2020-01-01 RX ORDER — HEPARIN SODIUM,PORCINE 10 UNIT/ML
2-5 VIAL (ML) INTRAVENOUS
Status: DISCONTINUED | OUTPATIENT
Start: 2020-01-01 | End: 2020-01-01 | Stop reason: HOSPADM

## 2020-01-01 RX ORDER — OXYMETAZOLINE HYDROCHLORIDE 0.05 G/100ML
5 SPRAY NASAL
Status: DISCONTINUED | OUTPATIENT
Start: 2020-01-01 | End: 2020-01-01 | Stop reason: HOSPADM

## 2020-01-01 RX ORDER — CALCIUM CHLORIDE 100 MG/ML
1 INJECTION INTRAVENOUS; INTRAVENTRICULAR ONCE
Status: DISCONTINUED | OUTPATIENT
Start: 2020-01-01 | End: 2020-01-01 | Stop reason: HOSPADM

## 2020-01-01 RX ORDER — DEXTROSE MONOHYDRATE 50 MG/ML
10-20 INJECTION, SOLUTION INTRAVENOUS EVERY 24 HOURS
Status: DISCONTINUED | OUTPATIENT
Start: 2020-01-01 | End: 2020-01-01 | Stop reason: HOSPADM

## 2020-01-01 RX ORDER — DEXTROSE MONOHYDRATE 25 G/50ML
50 INJECTION, SOLUTION INTRAVENOUS ONCE
Status: COMPLETED | OUTPATIENT
Start: 2020-01-01 | End: 2020-01-01

## 2020-01-01 RX ORDER — POTASSIUM CHLORIDE 29.8 MG/ML
20 INJECTION INTRAVENOUS EVERY 6 HOURS PRN
Status: DISCONTINUED | OUTPATIENT
Start: 2020-01-01 | End: 2020-01-01 | Stop reason: HOSPADM

## 2020-01-01 RX ORDER — OXYMETAZOLINE HYDROCHLORIDE 0.05 G/100ML
2 SPRAY NASAL CONTINUOUS PRN
Status: DISCONTINUED | OUTPATIENT
Start: 2020-01-01 | End: 2020-01-01

## 2020-01-01 RX ORDER — HEPARIN SODIUM,PORCINE 10 UNIT/ML
5-15 VIAL (ML) INTRAVENOUS EVERY 24 HOURS
Status: DISCONTINUED | OUTPATIENT
Start: 2020-01-01 | End: 2020-01-01 | Stop reason: HOSPADM

## 2020-01-01 RX ORDER — DEXTROSE MONOHYDRATE 100 MG/ML
INJECTION, SOLUTION INTRAVENOUS CONTINUOUS
Status: DISCONTINUED | OUTPATIENT
Start: 2020-01-01 | End: 2020-01-01

## 2020-01-01 RX ORDER — ALBUMIN (HUMAN) 12.5 G/50ML
50 SOLUTION INTRAVENOUS DAILY
Status: DISCONTINUED | OUTPATIENT
Start: 2020-01-01 | End: 2020-01-01

## 2020-01-01 RX ORDER — LIDOCAINE 40 MG/G
CREAM TOPICAL
Status: DISCONTINUED
Start: 2020-01-01 | End: 2020-01-01 | Stop reason: HOSPADM

## 2020-01-01 RX ORDER — CALCIUM GLUCONATE 94 MG/ML
1 INJECTION, SOLUTION INTRAVENOUS ONCE
Status: DISCONTINUED | OUTPATIENT
Start: 2020-01-01 | End: 2020-01-01 | Stop reason: CLARIF

## 2020-01-01 RX ORDER — DIPHENHYDRAMINE HYDROCHLORIDE 50 MG/ML
25 INJECTION INTRAMUSCULAR; INTRAVENOUS EVERY 24 HOURS
Status: DISCONTINUED | OUTPATIENT
Start: 2020-01-01 | End: 2020-01-01 | Stop reason: HOSPADM

## 2020-01-01 RX ORDER — DEXTROSE MONOHYDRATE 25 G/50ML
INJECTION, SOLUTION INTRAVENOUS
Status: COMPLETED
Start: 2020-01-01 | End: 2020-01-01

## 2020-01-01 RX ORDER — DEXTROSE 40 G/100ML
1-100 INJECTION, SOLUTION INTRAVENOUS CONTINUOUS
Status: DISCONTINUED | OUTPATIENT
Start: 2020-01-01 | End: 2020-01-01 | Stop reason: CLARIF

## 2020-01-01 RX ORDER — PROPOFOL 10 MG/ML
5-75 INJECTION, EMULSION INTRAVENOUS CONTINUOUS
Status: DISCONTINUED | OUTPATIENT
Start: 2020-01-01 | End: 2020-01-01

## 2020-01-01 RX ORDER — VANCOMYCIN HYDROCHLORIDE 1 G/200ML
1000 INJECTION, SOLUTION INTRAVENOUS EVERY 24 HOURS
Status: DISCONTINUED | OUTPATIENT
Start: 2020-01-01 | End: 2020-01-01 | Stop reason: HOSPADM

## 2020-01-01 RX ORDER — PIPERACILLIN SODIUM, TAZOBACTAM SODIUM 4; .5 G/20ML; G/20ML
4.5 INJECTION, POWDER, LYOPHILIZED, FOR SOLUTION INTRAVENOUS EVERY 6 HOURS
Status: DISCONTINUED | OUTPATIENT
Start: 2020-01-01 | End: 2020-01-01 | Stop reason: HOSPADM

## 2020-01-01 RX ORDER — PIPERACILLIN SODIUM, TAZOBACTAM SODIUM 2; .25 G/10ML; G/10ML
2.25 INJECTION, POWDER, LYOPHILIZED, FOR SOLUTION INTRAVENOUS EVERY 6 HOURS
Status: DISCONTINUED | OUTPATIENT
Start: 2020-01-01 | End: 2020-01-01

## 2020-01-01 RX ORDER — LIDOCAINE HYDROCHLORIDE 10 MG/ML
INJECTION, SOLUTION EPIDURAL; INFILTRATION; INTRACAUDAL; PERINEURAL
Status: COMPLETED
Start: 2020-01-01 | End: 2020-01-01

## 2020-01-01 RX ORDER — LIDOCAINE 40 MG/G
CREAM TOPICAL
Status: DISCONTINUED | OUTPATIENT
Start: 2020-01-01 | End: 2020-01-01 | Stop reason: HOSPADM

## 2020-01-01 RX ORDER — CEFTRIAXONE 1 G/1
1 INJECTION, POWDER, FOR SOLUTION INTRAMUSCULAR; INTRAVENOUS EVERY 24 HOURS
Status: DISCONTINUED | OUTPATIENT
Start: 2020-01-01 | End: 2020-01-01

## 2020-01-01 RX ORDER — DIPHENHYDRAMINE HYDROCHLORIDE 50 MG/ML
25 INJECTION INTRAMUSCULAR; INTRAVENOUS EVERY 6 HOURS PRN
Status: DISCONTINUED | OUTPATIENT
Start: 2020-01-01 | End: 2020-01-01 | Stop reason: HOSPADM

## 2020-01-01 RX ADMIN — SALINE NASAL SPRAY 2 SPRAY: 1.5 SOLUTION NASAL at 20:28

## 2020-01-01 RX ADMIN — SALINE NASAL SPRAY 2 SPRAY: 1.5 SOLUTION NASAL at 18:57

## 2020-01-01 RX ADMIN — Medication 0.4 MCG/KG/MIN: at 09:03

## 2020-01-01 RX ADMIN — VANCOMYCIN HYDROCHLORIDE 1000 MG: 1 INJECTION, SOLUTION INTRAVENOUS at 21:34

## 2020-01-01 RX ADMIN — SODIUM CHLORIDE: 9 INJECTION, SOLUTION INTRAVENOUS at 23:47

## 2020-01-01 RX ADMIN — SODIUM BICARBONATE: 84 INJECTION, SOLUTION INTRAVENOUS at 09:11

## 2020-01-01 RX ADMIN — PIPERACILLIN SODIUM AND TAZOBACTAM SODIUM 2.25 G: 2; .25 INJECTION, POWDER, LYOPHILIZED, FOR SOLUTION INTRAVENOUS at 14:06

## 2020-01-01 RX ADMIN — SODIUM BICARBONATE: 84 INJECTION, SOLUTION INTRAVENOUS at 08:38

## 2020-01-01 RX ADMIN — Medication 1 MCG/KG/MIN: at 22:28

## 2020-01-01 RX ADMIN — CALCIUM CHLORIDE, MAGNESIUM CHLORIDE, DEXTROSE MONOHYDRATE, LACTIC ACID, SODIUM CHLORIDE, SODIUM BICARBONATE AND POTASSIUM CHLORIDE 12.5 ML/KG/HR: 5.15; 2.03; 22; 5.4; 6.46; 3.09; .157 INJECTION INTRAVENOUS at 17:39

## 2020-01-01 RX ADMIN — SODIUM BICARBONATE 100 MEQ: 84 INJECTION, SOLUTION INTRAVENOUS at 21:15

## 2020-01-01 RX ADMIN — ACYCLOVIR SODIUM 500 MG: 50 INJECTION, SOLUTION INTRAVENOUS at 21:02

## 2020-01-01 RX ADMIN — DEXTROSE MONOHYDRATE 1000 ML: 50 INJECTION, SOLUTION INTRAVENOUS at 13:47

## 2020-01-01 RX ADMIN — PANTOPRAZOLE SODIUM 40 MG: 40 INJECTION, POWDER, FOR SOLUTION INTRAVENOUS at 08:37

## 2020-01-01 RX ADMIN — SODIUM BICARBONATE: 84 INJECTION, SOLUTION INTRAVENOUS at 14:09

## 2020-01-01 RX ADMIN — DEXTROSE 50 % IN WATER (D50W) INTRAVENOUS SYRINGE 50 ML: at 17:38

## 2020-01-01 RX ADMIN — CALCIUM CHLORIDE 1 G: 100 INJECTION, SOLUTION INTRAVENOUS at 17:00

## 2020-01-01 RX ADMIN — PIPERACILLIN AND TAZOBACTAM 4.5 G: 4; .5 INJECTION, POWDER, FOR SOLUTION INTRAVENOUS at 20:47

## 2020-01-01 RX ADMIN — ACETYLCYSTEINE 6.25 MG/KG/HR: 200 INJECTION, SOLUTION INTRAVENOUS at 08:03

## 2020-01-01 RX ADMIN — Medication 50 MCG/HR: at 13:55

## 2020-01-01 RX ADMIN — PIPERACILLIN SODIUM AND TAZOBACTAM SODIUM 2.25 G: 2; .25 INJECTION, POWDER, LYOPHILIZED, FOR SOLUTION INTRAVENOUS at 20:20

## 2020-01-01 RX ADMIN — OXYMETAZOLINE HYDROCHLORIDE 3 SPRAY: 0.05 SPRAY NASAL at 01:57

## 2020-01-01 RX ADMIN — PHENYLEPHRINE HYDROCHLORIDE 0.5 MCG/KG/MIN: 10 INJECTION INTRAVENOUS at 07:26

## 2020-01-01 RX ADMIN — PHYTONADIONE 10 MG: 10 INJECTION, EMULSION INTRAMUSCULAR; INTRAVENOUS; SUBCUTANEOUS at 08:15

## 2020-01-01 RX ADMIN — CALCIUM CHLORIDE 4 G: 100 INJECTION, SOLUTION INTRAVENOUS at 06:29

## 2020-01-01 RX ADMIN — Medication 4 UNITS/HR: at 16:08

## 2020-01-01 RX ADMIN — PANTOPRAZOLE SODIUM 40 MG: 40 INJECTION, POWDER, FOR SOLUTION INTRAVENOUS at 04:37

## 2020-01-01 RX ADMIN — SODIUM BICARBONATE: 84 INJECTION, SOLUTION INTRAVENOUS at 19:27

## 2020-01-01 RX ADMIN — PANTOPRAZOLE SODIUM 40 MG: 40 INJECTION, POWDER, FOR SOLUTION INTRAVENOUS at 20:47

## 2020-01-01 RX ADMIN — PHYTONADIONE 10 MG: 10 INJECTION, EMULSION INTRAMUSCULAR; INTRAVENOUS; SUBCUTANEOUS at 09:45

## 2020-01-01 RX ADMIN — AMPHOTERICIN B 150 MG: 50 INJECTION, POWDER, LYOPHILIZED, FOR SOLUTION INTRAVENOUS at 18:31

## 2020-01-01 RX ADMIN — SODIUM BICARBONATE 50 MEQ: 84 INJECTION, SOLUTION INTRAVENOUS at 23:13

## 2020-01-01 RX ADMIN — CALCIUM GLUCONATE 3 G: 98 INJECTION, SOLUTION INTRAVENOUS at 09:45

## 2020-01-01 RX ADMIN — DEXTROSE MONOHYDRATE 20 ML: 50 INJECTION, SOLUTION INTRAVENOUS at 18:22

## 2020-01-01 RX ADMIN — SODIUM BICARBONATE: 84 INJECTION, SOLUTION INTRAVENOUS at 23:10

## 2020-01-01 RX ADMIN — CALCIUM CHLORIDE, MAGNESIUM CHLORIDE, DEXTROSE MONOHYDRATE, LACTIC ACID, SODIUM CHLORIDE, SODIUM BICARBONATE AND POTASSIUM CHLORIDE 12.5 ML/KG/HR: 5.15; 2.03; 22; 5.4; 6.46; 3.09; .157 INJECTION INTRAVENOUS at 01:59

## 2020-01-01 RX ADMIN — Medication 0.15 MCG/KG/MIN: at 12:42

## 2020-01-01 RX ADMIN — ACETYLCYSTEINE 6.25 MG/KG/HR: 200 INJECTION, SOLUTION INTRAVENOUS at 12:18

## 2020-01-01 RX ADMIN — PIPERACILLIN SODIUM AND TAZOBACTAM SODIUM 2.25 G: 2; .25 INJECTION, POWDER, LYOPHILIZED, FOR SOLUTION INTRAVENOUS at 02:09

## 2020-01-01 RX ADMIN — SODIUM BICARBONATE 35 ML/HR: 84 INJECTION INTRAVENOUS at 08:01

## 2020-01-01 RX ADMIN — DEXTROSE 500 ML: 20 INJECTION, SOLUTION INTRAVENOUS at 14:48

## 2020-01-01 RX ADMIN — HYDROCORTISONE SODIUM SUCCINATE 50 MG: 100 INJECTION, POWDER, FOR SOLUTION INTRAMUSCULAR; INTRAVENOUS at 16:08

## 2020-01-01 RX ADMIN — Medication 50 MCG: at 10:39

## 2020-01-01 RX ADMIN — DIPHENHYDRAMINE HYDROCHLORIDE 25 MG: 50 INJECTION, SOLUTION INTRAMUSCULAR; INTRAVENOUS at 17:22

## 2020-01-01 RX ADMIN — SODIUM BICARBONATE 35 ML/HR: 84 INJECTION, SOLUTION INTRAVENOUS at 20:22

## 2020-01-01 RX ADMIN — VANCOMYCIN HYDROCHLORIDE 1250 MG: 10 INJECTION, POWDER, LYOPHILIZED, FOR SOLUTION INTRAVENOUS at 18:24

## 2020-01-01 RX ADMIN — DOXYCYCLINE 100 MG: 100 INJECTION, POWDER, LYOPHILIZED, FOR SOLUTION INTRAVENOUS at 03:35

## 2020-01-01 RX ADMIN — Medication 50 MCG: at 12:05

## 2020-01-01 RX ADMIN — DEXTROSE MONOHYDRATE 50 ML: 500 INJECTION PARENTERAL at 23:47

## 2020-01-01 RX ADMIN — SODIUM BICARBONATE 35 ML/HR: 84 INJECTION, SOLUTION INTRAVENOUS at 15:01

## 2020-01-01 RX ADMIN — ALBUMIN HUMAN 50 G: 0.25 SOLUTION INTRAVENOUS at 08:17

## 2020-01-01 RX ADMIN — SODIUM CHLORIDE 500 ML: 9 INJECTION, SOLUTION INTRAVENOUS at 17:55

## 2020-01-01 RX ADMIN — Medication 4 UNITS/HR: at 07:37

## 2020-01-01 RX ADMIN — DEXTROSE MONOHYDRATE 50 ML: 500 INJECTION PARENTERAL at 00:46

## 2020-01-01 RX ADMIN — OXYMETAZOLINE HYDROCHLORIDE 3 SPRAY: 0.05 SPRAY NASAL at 22:16

## 2020-01-01 RX ADMIN — SODIUM BICARBONATE 50 MEQ: 84 INJECTION, SOLUTION INTRAVENOUS at 01:24

## 2020-01-01 RX ADMIN — DEXTROSE MONOHYDRATE: 100 INJECTION, SOLUTION INTRAVENOUS at 04:33

## 2020-01-01 RX ADMIN — ANGIOTENSIN II 20 NG/KG/MIN: 2.5 INJECTION INTRAVENOUS at 16:05

## 2020-01-01 RX ADMIN — PHENYLEPHRINE HYDROCHLORIDE 6 MCG/KG/MIN: 10 INJECTION INTRAVENOUS at 21:03

## 2020-01-01 RX ADMIN — OXYMETAZOLINE HYDROCHLORIDE 3 SPRAY: 0.05 SPRAY NASAL at 18:57

## 2020-01-01 RX ADMIN — HYDROCORTISONE SODIUM SUCCINATE 50 MG: 100 INJECTION, POWDER, FOR SOLUTION INTRAMUSCULAR; INTRAVENOUS at 21:26

## 2020-01-01 RX ADMIN — Medication 50 MCG: at 13:57

## 2020-01-01 RX ADMIN — Medication 1 MCG/KG/MIN: at 17:34

## 2020-01-01 RX ADMIN — CALCIUM CHLORIDE 2 G: 100 INJECTION, SOLUTION INTRAVENOUS at 23:33

## 2020-01-01 RX ADMIN — Medication 4 UNITS/HR: at 02:11

## 2020-01-01 RX ADMIN — Medication 20 NG/KG/MIN: at 16:41

## 2020-01-01 RX ADMIN — PIPERACILLIN AND TAZOBACTAM 4.5 G: 4; .5 INJECTION, POWDER, FOR SOLUTION INTRAVENOUS at 01:58

## 2020-01-01 RX ADMIN — IPRATROPIUM BROMIDE AND ALBUTEROL SULFATE 3 ML: .5; 3 SOLUTION RESPIRATORY (INHALATION) at 21:41

## 2020-01-01 RX ADMIN — CALCIUM GLUCONATE 1 G: 98 INJECTION, SOLUTION INTRAVENOUS at 02:42

## 2020-01-01 RX ADMIN — SODIUM BICARBONATE 50 MEQ/HR: 84 INJECTION, SOLUTION INTRAVENOUS at 22:37

## 2020-01-01 RX ADMIN — PHENYLEPHRINE HYDROCHLORIDE 3 MCG/KG/MIN: 10 INJECTION INTRAVENOUS at 09:55

## 2020-01-01 RX ADMIN — CALCIUM CHLORIDE 2 G: 100 INJECTION, SOLUTION INTRAVENOUS at 22:08

## 2020-01-01 RX ADMIN — ACETYLCYSTEINE 2.52 G: 200 INJECTION, SOLUTION INTRAVENOUS at 07:38

## 2020-01-01 RX ADMIN — ACETYLCYSTEINE 6.25 MG/KG/HR: 200 INJECTION, SOLUTION INTRAVENOUS at 03:18

## 2020-01-01 RX ADMIN — SALINE NASAL SPRAY 2 SPRAY: 1.5 SOLUTION NASAL at 22:16

## 2020-01-01 RX ADMIN — Medication 2.4 UNITS/HR: at 21:38

## 2020-01-01 RX ADMIN — SALINE NASAL SPRAY 2 SPRAY: 1.5 SOLUTION NASAL at 15:40

## 2020-01-01 RX ADMIN — Medication 20 NG/KG/MIN: at 00:15

## 2020-01-01 RX ADMIN — CALCIUM GLUCONATE 2 G: 98 INJECTION, SOLUTION INTRAVENOUS at 05:39

## 2020-01-01 RX ADMIN — DOXYCYCLINE 100 MG: 100 INJECTION, POWDER, LYOPHILIZED, FOR SOLUTION INTRAVENOUS at 15:39

## 2020-01-01 RX ADMIN — ALBUMIN HUMAN 50 G: 0.25 SOLUTION INTRAVENOUS at 09:20

## 2020-01-01 RX ADMIN — PIPERACILLIN AND TAZOBACTAM 4.5 G: 4; .5 INJECTION, POWDER, FOR SOLUTION INTRAVENOUS at 13:13

## 2020-01-01 RX ADMIN — OCTREOTIDE ACETATE 50 MCG/HR: 200 INJECTION, SOLUTION INTRAVENOUS; SUBCUTANEOUS at 17:14

## 2020-01-01 RX ADMIN — CALCIUM CHLORIDE, MAGNESIUM CHLORIDE, DEXTROSE MONOHYDRATE, LACTIC ACID, SODIUM CHLORIDE, SODIUM BICARBONATE AND POTASSIUM CHLORIDE 12.5 ML/KG/HR: 5.15; 2.03; 22; 5.4; 6.46; 3.09; .157 INJECTION INTRAVENOUS at 09:10

## 2020-01-01 RX ADMIN — SALINE NASAL SPRAY 2 SPRAY: 1.5 SOLUTION NASAL at 23:40

## 2020-01-01 RX ADMIN — PIPERACILLIN SODIUM AND TAZOBACTAM SODIUM 2.25 G: 2; .25 INJECTION, POWDER, LYOPHILIZED, FOR SOLUTION INTRAVENOUS at 08:37

## 2020-01-01 RX ADMIN — ACETYLCYSTEINE 7.56 G: 200 INJECTION, SOLUTION INTRAVENOUS at 06:27

## 2020-01-01 RX ADMIN — SALINE NASAL SPRAY 2 SPRAY: 1.5 SOLUTION NASAL at 01:57

## 2020-01-01 RX ADMIN — OXYMETAZOLINE HYDROCHLORIDE 3 SPRAY: 0.05 SPRAY NASAL at 14:18

## 2020-01-01 RX ADMIN — Medication 1 MCG/KG/MIN: at 03:38

## 2020-01-01 RX ADMIN — CALCIUM CHLORIDE 3 G: 100 INJECTION, SOLUTION INTRAVENOUS at 11:48

## 2020-01-01 RX ADMIN — PIPERACILLIN SODIUM AND TAZOBACTAM SODIUM 2.25 G: 2; .25 INJECTION, POWDER, LYOPHILIZED, FOR SOLUTION INTRAVENOUS at 06:55

## 2020-01-01 RX ADMIN — DEXTROSE MONOHYDRATE 60 ML/HR: 70 INJECTION, SOLUTION INTRAVENOUS at 00:56

## 2020-01-01 RX ADMIN — SODIUM BICARBONATE: 84 INJECTION, SOLUTION INTRAVENOUS at 13:20

## 2020-01-01 RX ADMIN — CALCIUM CHLORIDE 2 G: 100 INJECTION, SOLUTION INTRAVENOUS at 15:30

## 2020-01-01 RX ADMIN — PROPOFOL 10 MCG/KG/MIN: 10 INJECTION, EMULSION INTRAVENOUS at 13:59

## 2020-01-01 RX ADMIN — CALCIUM CHLORIDE, MAGNESIUM CHLORIDE, DEXTROSE MONOHYDRATE, LACTIC ACID, SODIUM CHLORIDE, SODIUM BICARBONATE AND POTASSIUM CHLORIDE 12.5 ML/KG/HR: 5.15; 2.03; 22; 5.4; 6.46; 3.09; .157 INJECTION INTRAVENOUS at 09:05

## 2020-01-01 RX ADMIN — SODIUM BICARBONATE 35 ML/HR: 84 INJECTION, SOLUTION INTRAVENOUS at 02:00

## 2020-01-01 RX ADMIN — LIDOCAINE HYDROCHLORIDE 2 ML: 10 INJECTION, SOLUTION EPIDURAL; INFILTRATION; INTRACAUDAL; PERINEURAL at 16:38

## 2020-01-01 RX ADMIN — SODIUM BICARBONATE: 84 INJECTION, SOLUTION INTRAVENOUS at 01:18

## 2020-01-01 RX ADMIN — PHYTONADIONE 10 MG: 10 INJECTION, EMULSION INTRAMUSCULAR; INTRAVENOUS; SUBCUTANEOUS at 00:11

## 2020-01-01 RX ADMIN — HYDROCORTISONE SODIUM SUCCINATE 50 MG: 100 INJECTION, POWDER, FOR SOLUTION INTRAMUSCULAR; INTRAVENOUS at 03:36

## 2020-01-01 RX ADMIN — LIDOCAINE HYDROCHLORIDE 5 ML: 10 INJECTION, SOLUTION EPIDURAL; INFILTRATION; INTRACAUDAL; PERINEURAL at 11:35

## 2020-01-01 RX ADMIN — CALCIUM CHLORIDE, MAGNESIUM CHLORIDE, DEXTROSE MONOHYDRATE, LACTIC ACID, SODIUM CHLORIDE, SODIUM BICARBONATE AND POTASSIUM CHLORIDE 12.5 ML/KG/HR: 5.15; 2.03; 22; 5.4; 6.46; 3.09; .157 INJECTION INTRAVENOUS at 01:58

## 2020-01-01 RX ADMIN — PANTOPRAZOLE SODIUM 40 MG: 40 INJECTION, POWDER, FOR SOLUTION INTRAVENOUS at 20:18

## 2020-01-01 ASSESSMENT — ACTIVITIES OF DAILY LIVING (ADL)
ADLS_ACUITY_SCORE: 31
ADLS_ACUITY_SCORE: 25
ADLS_ACUITY_SCORE: 31
ADLS_ACUITY_SCORE: 20
ADLS_ACUITY_SCORE: 19
ADLS_ACUITY_SCORE: 31
ADLS_ACUITY_SCORE: 25
ADLS_ACUITY_SCORE: 25
ADLS_ACUITY_SCORE: 31

## 2020-01-01 ASSESSMENT — MIFFLIN-ST. JEOR
SCORE: 1289.01
SCORE: 1219.01

## 2020-01-01 ASSESSMENT — ENCOUNTER SYMPTOMS
NAUSEA: 1
BLOOD IN STOOL: 1
COUGH: 0
SHORTNESS OF BREATH: 1
VOMITING: 1

## 2020-07-10 ENCOUNTER — HOSPITAL ENCOUNTER (EMERGENCY)
Dept: HOSPITAL 94 - ER | Age: 50
Discharge: ANOTHER HOSPITAL | End: 2020-07-10
Payer: SELF-PAY

## 2020-07-10 VITALS — BODY MASS INDEX: 19.98 KG/M2 | WEIGHT: 105.82 LBS | HEIGHT: 61 IN

## 2020-07-10 VITALS — SYSTOLIC BLOOD PRESSURE: 112 MMHG | DIASTOLIC BLOOD PRESSURE: 71 MMHG

## 2020-07-10 VITALS — DIASTOLIC BLOOD PRESSURE: 83 MMHG | SYSTOLIC BLOOD PRESSURE: 110 MMHG

## 2020-07-10 VITALS — SYSTOLIC BLOOD PRESSURE: 114 MMHG | DIASTOLIC BLOOD PRESSURE: 77 MMHG

## 2020-07-10 VITALS — SYSTOLIC BLOOD PRESSURE: 118 MMHG | DIASTOLIC BLOOD PRESSURE: 86 MMHG

## 2020-07-10 DIAGNOSIS — R04.0: Primary | ICD-10-CM

## 2020-07-10 DIAGNOSIS — N28.9: ICD-10-CM

## 2020-07-10 DIAGNOSIS — R53.1: ICD-10-CM

## 2020-07-10 DIAGNOSIS — D61.818: ICD-10-CM

## 2020-07-10 DIAGNOSIS — Z03.818: ICD-10-CM

## 2020-07-10 DIAGNOSIS — D72.825: ICD-10-CM

## 2020-07-10 DIAGNOSIS — E87.1: ICD-10-CM

## 2020-07-10 DIAGNOSIS — Z79.899: ICD-10-CM

## 2020-07-10 LAB
ALBUMIN SERPL BCP-MCNC: 2.4 G/DL (ref 3.4–5)
ALBUMIN/GLOB SERPL: 0.7 {RATIO} (ref 1.1–1.5)
ALP SERPL-CCNC: 1042 IU/L (ref 46–116)
ALT SERPL W P-5'-P-CCNC: 624 U/L (ref 12–78)
ANION GAP SERPL CALCULATED.3IONS-SCNC: 8 MMOL/L (ref 8–16)
ANISOCYTOSIS BLD QL SMEAR: (no result)
APAP SERPL-MCNC: < 2 UG/ML (ref 10–30)
APTT PPP: 63 SECONDS (ref 22–32)
AST SERPL W P-5'-P-CCNC: 1583 U/L (ref 10–37)
BACTERIA URNS QL MICRO: (no result) /HPF
BASOPHILS # BLD AUTO: 0 X10'3 (ref 0–0.2)
BASOPHILS NFR BLD AUTO: 0.4 % (ref 0–1)
BILIRUB SERPL-MCNC: 4.3 MG/DL (ref 0.1–1)
BUN SERPL-MCNC: 25 MG/DL (ref 7–18)
BUN/CREAT SERPL: 17.9 (ref 5.4–32)
CALCIUM SERPL-MCNC: 7 MG/DL (ref 8.5–10.1)
CHLORIDE SERPL-SCNC: 98 MMOL/L (ref 99–107)
CLARITY UR: CLEAR
CO2 SERPL-SCNC: 19.6 MMOL/L (ref 24–32)
COARSE GRAN CASTS URNS QL MICRO: (no result) /LPF
COLOR UR: (no result)
CREAT SERPL-MCNC: 1.4 MG/DL (ref 0.6–1.1)
DEPRECATED SQUAMOUS URNS QL MICRO: (no result) /LPF
EOSINOPHIL # BLD AUTO: 0 X10'3 (ref 0–0.9)
EOSINOPHIL NFR BLD AUTO: 0.2 % (ref 0–6)
ERYTHROCYTE [DISTWIDTH] IN BLOOD BY AUTOMATED COUNT: 14 % (ref 11.5–14.5)
ETHANOL SERPL-MCNC: < 0.01 GM/DL (ref 0–0.01)
GFR SERPL CREATININE-BSD FRML MDRD: 54 ML/MIN
GLUCOSE SERPL-MCNC: 110 MG/DL (ref 70–104)
GLUCOSE UR STRIP-MCNC: NEGATIVE MG/DL
HCT VFR BLD AUTO: 35.6 % (ref 42–52)
HGB BLD-MCNC: 12 G/DL (ref 14–17.9)
HGB UR QL STRIP: (no result)
KETONES UR STRIP-MCNC: NEGATIVE MG/DL
LEUKOCYTE ESTERASE UR QL STRIP: NEGATIVE
LYMPHOCYTES # BLD AUTO: 0.6 X10'3 (ref 1.1–4.8)
LYMPHOCYTES NFR BLD AUTO: 27.4 % (ref 21–51)
LYMPHOCYTES NFR BLD MANUAL: 33 % (ref 21–51)
MCH RBC QN AUTO: 28 PG (ref 27–31)
MCHC RBC AUTO-ENTMCNC: 33.8 G/DL (ref 33–36.5)
MCV RBC AUTO: 83.1 FL (ref 78–98)
METAMYELOCYTES NFR BLD MANUAL: 2 % (ref 0–0)
MICROCYTES BLD QL SMEAR: (no result)
MONOCYTES # BLD AUTO: 0 X10'3 (ref 0–0.9)
MONOCYTES NFR BLD AUTO: 1.3 % (ref 2–12)
MONOCYTES NFR BLD MANUAL: 3 % (ref 2–12)
MUCOUS THREADS URNS QL MICRO: (no result) /LPF
NEUTROPHILS # BLD AUTO: 1.5 X10'3 (ref 1.8–7.7)
NEUTROPHILS NFR BLD AUTO: 70.7 % (ref 42–75)
NEUTS BAND # BLD MANUAL: 30 % (ref 0–10)
NEUTS SEG NFR BLD MANUAL: 32 % (ref 42–75)
NITRITE UR QL STRIP: NEGATIVE
PH UR STRIP: 6.5 [PH] (ref 4.8–8)
PLATELET # BLD AUTO: 41 X10'3 (ref 140–440)
PLATELET BLD QL SMEAR: (no result)
PMV BLD AUTO: 8.1 FL (ref 7.4–10.4)
POTASSIUM SERPL-SCNC: 3.8 MMOL/L (ref 3.5–5.1)
PROT SERPL-MCNC: 5.7 G/DL (ref 6.4–8.2)
PROT UR QL STRIP: 100 MG/DL
RBC # BLD AUTO: 4.29 X10'6 (ref 4.7–6.1)
RBC #/AREA URNS HPF: (no result) /HPF (ref 0–2)
RBC MORPH BLD: (no result)
SODIUM SERPL-SCNC: 126 MMOL/L (ref 135–145)
SP GR UR STRIP: 1.02 (ref 1–1.03)
TOTAL CELLS COUNTED FLD: 100
URN COLLECT METHOD CLASS: (no result)
UROBILINOGEN UR STRIP-MCNC: 1 E.U/DL (ref 0.2–1)
WBC # BLD AUTO: 2.1 X10'3 (ref 4.5–11)
WBC #/AREA URNS HPF: (no result) /HPF (ref 0–4)

## 2020-07-10 PROCEDURE — 85730 THROMBOPLASTIN TIME PARTIAL: CPT

## 2020-07-10 PROCEDURE — 96365 THER/PROPH/DIAG IV INF INIT: CPT

## 2020-07-10 PROCEDURE — 85025 COMPLETE CBC W/AUTO DIFF WBC: CPT

## 2020-07-10 PROCEDURE — 80053 COMPREHEN METABOLIC PANEL: CPT

## 2020-07-10 PROCEDURE — 96366 THER/PROPH/DIAG IV INF ADDON: CPT

## 2020-07-10 PROCEDURE — 87040 BLOOD CULTURE FOR BACTERIA: CPT

## 2020-07-10 PROCEDURE — 99285 EMERGENCY DEPT VISIT HI MDM: CPT

## 2020-07-10 PROCEDURE — 74176 CT ABD & PELVIS W/O CONTRAST: CPT

## 2020-07-10 PROCEDURE — 36415 COLL VENOUS BLD VENIPUNCTURE: CPT

## 2020-07-10 PROCEDURE — 86885 COOMBS TEST INDIRECT QUAL: CPT

## 2020-07-10 PROCEDURE — 80329 ANALGESICS NON-OPIOID 1 OR 2: CPT

## 2020-07-10 PROCEDURE — 83605 ASSAY OF LACTIC ACID: CPT

## 2020-07-10 PROCEDURE — 76700 US EXAM ABDOM COMPLETE: CPT

## 2020-07-10 PROCEDURE — 30901 CONTROL OF NOSEBLEED: CPT

## 2020-07-10 PROCEDURE — 85384 FIBRINOGEN ACTIVITY: CPT

## 2020-07-10 PROCEDURE — 85610 PROTHROMBIN TIME: CPT

## 2020-07-10 PROCEDURE — 87635 SARS-COV-2 COVID-19 AMP PRB: CPT

## 2020-07-10 PROCEDURE — 80320 DRUG SCREEN QUANTALCOHOLS: CPT

## 2020-07-10 PROCEDURE — 84145 PROCALCITONIN (PCT): CPT

## 2020-07-10 PROCEDURE — 86901 BLOOD TYPING SEROLOGIC RH(D): CPT

## 2020-07-10 PROCEDURE — 71045 X-RAY EXAM CHEST 1 VIEW: CPT

## 2020-07-10 PROCEDURE — 81001 URINALYSIS AUTO W/SCOPE: CPT

## 2020-07-10 PROCEDURE — 84443 ASSAY THYROID STIM HORMONE: CPT

## 2020-07-10 PROCEDURE — 36430 TRANSFUSION BLD/BLD COMPNT: CPT

## 2020-07-10 PROCEDURE — 86900 BLOOD TYPING SEROLOGIC ABO: CPT

## 2020-07-10 NOTE — NUR
Attempted to phone report to Nursing staff at [facility]. 
Charge nurse asked moore clerk to request our staff to attempt to call back 
later because they have a Code happening at this time and staff are unable to 
receive telephone report.

## 2020-07-10 NOTE — NUR
After the provider completed the medication administration and the nasal 
packing, the bleeding is under control. IV fluid bolus completed. Awaiting lab 
results for further orders or disposition.

## 2020-07-10 NOTE — NUR
Translation phone used to review and sign the consent for blood product. Pt 
verbalized understanding of risks and benefits. Second IV access and second set 
of blood cultures are being obtained at this time.

## 2020-07-20 PROBLEM — K72.00 ACUTE LIVER FAILURE: Status: ACTIVE | Noted: 2020-01-01

## 2020-07-20 NOTE — PROCEDURES
[facility], [place]    Intubation    Date/Time: 7/20/2020 1:01 PM  Performed by: Mychal Greer MD  Authorized by: Mychal Greer MD   Indications: respiratory distress  Intubation method: video-assisted    UNIVERSAL PROTOCOL   Site Marked: NA  Prior Images Obtained and Reviewed:  NA  Required items: Required blood products, implants, devices and special equipment available    Patient identity confirmed:  Verbally with patient  Patient was reevaluated immediately before administering moderate or deep sedation or anesthesia  Confirmation Checklist:  Patient's identity using two indicators  Time out: Immediately prior to the procedure a time out was called    Universal Protocol: the Joint Commission Universal Protocol was followed          Patient status: paralyzed (RSI)  Preoxygenation: BIPAP.  Pretreatment medications: none  Paralytic: rocuronium  Sedatives: etomidate  Laryngoscope size: Mac 4  Tube size: 7.5 mm  Tube type: cuffed  Number of attempts: 2  Ventilation between attempts: BVM  Cricoid pressure: yes  Cords visualized: yes  Post-procedure assessment: chest rise and ETCO2 monitor  Breath sounds: equal  Cuff inflated: yes  ETT to teeth: 25 cm  Chest x-ray findings: Chest xray pending   Tube secured with: ETT mcdowell      PROCEDURE   Length of time physician/provider present for 1:1 monitoring during sedation: 15

## 2020-07-20 NOTE — PHARMACY-VANCOMYCIN DOSING SERVICE
Pharmacy Vancomycin Initial Note  Date of Service 2020  Patient's  1970  50 year old, male    Indication: Sepsis    Current estimated CrCl = Estimated Creatinine Clearance: 10.6 mL/min (A) (based on SCr of 6.05 mg/dL (H)).    Creatinine for last 3 days  2020: 10:33 PM Creatinine 6.80 mg/dL  2020:  2:02 AM Creatinine 7.01 mg/dL;  3:45 AM Creatinine 6.42 mg/dL;  9:52 AM Creatinine 6.05 mg/dL    Recent Vancomycin Level(s) for last 3 days  No results found for requested labs within last 72 hours.      Vancomycin IV Administrations (past 72 hours)      No vancomycin orders with administrations in past 72 hours.                Nephrotoxins and other renal medications (From now, onward)    Start     Dose/Rate Route Frequency Ordered Stop    20 1600  acyclovir (ZOVIRAX) 500 mg in D5W 100 mL intermittent infusion      10 mg/kg × 50.4 kg (Dosing Weight)  100 mL/hr over 1 Hours Intravenous EVERY 24 HOURS 20 1442      20 1530  vancomycin 1250 mg in 0.9% NaCl 250 mL intermittent infusion 1,250 mg      1,250 mg  over 90 Minutes Intravenous ONCE 20 1527      20 1528  vancomycin place mcdowell - receiving intermittent dosing      1 each Intravenous SEE ADMIN INSTRUCTIONS 20 1528      20 1215  norepinephrine (LEVOPHED) 16 mg in  mL infusion      0.03-0.4 mcg/kg/min × 50.4 kg (Dosing Weight)  1.4-18.9 mL/hr  Intravenous CONTINUOUS 20 1211      20 0600  piperacillin-tazobactam (ZOSYN) 2.25 g vial to attach to  ml bag      2.25 g  over 30 Minutes Intravenous EVERY 6 HOURS 20 0452            Contrast Orders - past 72 hours (72h ago, onward)    None                Plan:  1.  Start vancomycin  1250 mg IV once, then intermittent dosing.   2.  Goal Trough Level: 15-20 mg/L   3.  Pharmacy will check trough levels as appropriate in 1-3 Days.    4. Serum creatinine levels will be ordered daily for the first week of therapy and at least twice weekly  for subsequent weeks.    5. Lovelock method utilized to dose vancomycin therapy: Method 2    Nelia Cedillo [facility]

## 2020-07-20 NOTE — CONSULTS
Psychosocial Assessment for Liver Transplant Evaluation  Living Situation: Ivanna came to the [place] on [place] under a travel visa, and was supposed to return to [facility] May 12, 2019.  He decided to stay in the [place] longer because his brother-in-law has cancer, and then the COVID-19 pandemic hit the [place].  Ivanna has been living between his sister Lexis's in [place], [place] (three hours away) and his sister Andrea's in [place], [place].  Ivanna went to visit his cousin in [place] for three weeks and two days, and was hospitalized for one week during that visit.  He returned to [place] by plane just yesterday and was brought directly to [facility].  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  He is not literate in English.  Hmong is his primary language.  Financial /Income: Ivanna does not have a source of income.  He has been financially supported by his two sisters while visiting the [place].  Health Insurance:  Ivanna is uninsured.  A referral has been placed to hospital financial counselors to assess patient for Emergency Medical Assistance.    Family/Social Support: Ivanna is  and has four children who all live in [facility].  His children are between the ages of 15-22.    Ivanna has two sisters, Lexis ([place], [place]) and Andrea ([place], [place]).   Ivanna's father is , and his mother lives in [facility].  Chemical Dependency: Ivanna does not drink alcohol.  He does smoke cigarettes.  He had been smoking a pack per day, and recently his sister Lexis began rationing his cigarettes to four cigarettes per day.   Lexis does not believe he drinks any alcohol.    Of note, patient had a positive toxicology screen for cocaine from the hospital in [place].  Mental Health: Ivanna has no known mental health history.  Adjustment to Illness:  Ivanna's sister Lexis reports her brother was not sick prior to traveling to [place] over  three weeks ago.  The medical record reports Ivanna was told there is something wrong with his liver but was not told the cause.     Impression/Recommendations: Information for this assessment was provided by patient's sister Lexis Conte ([phone]).  Patient's next-of-kin in the absence of a health care directive would be his wife.   I have asked Ivanna's sister Lexis to provide contact information for patient's wife.  Lexis reports Ivanna's wife and children live in the [facility] of [facility] where cell phone reception is poor.    Lexis reports her brother had planned to return to [facility] to be with his family as soon as it would be safe to travel.  He had been delaying returning due to COVID-19.   Ivanna may not have access to follow up care and medications when he returns to [facility], and this would greatly impact his survivability with transplantation.   [facility] Financial Counselors will evaluate Ivanna for health insurance programs.  Emergency Medical Assistance in [place] does not cover liver transplant evaluation or liver transplant surgery.  If health insurance cannot be obtained Ivanna would not have access to follow up care or medications critical to liver transplant success.         HAYLEY Tse, [facility]  Liver Transplant   Phone [phone]  Pager [phone]

## 2020-07-20 NOTE — PROGRESS NOTES
[facility] CRITICAL CARE STAFF NOTE    50 year old male with unknown past medical history with recent admission to [facility] in [place], [place] for reported liver failure presented to [facility] ED on 7/19 with SOB, hematochezia, and hematemesis and subsequently admitted to the ICU for worsening liver and kidney failure as well as worsening coagulopathy.     Overnight/Interim History:     Admitted overnight to the ICU with above acute liver and kidney injury of unclear etiology.  In addition, noted to have profound metabolic acidosis.    Acute Critical Care Issues/Key Findings:     Ivanna Espinoza is critically ill due to shock and hypoxemic respiratory failure.     Acute liver injury/failure: Unclear etiology but hypoperfusion versus toxic is possible.  Awaiting outside medical records from admission last week in [place] which may shed some light on recent clinical history.  GI consulted and appreciate their recommendations.  Broad infectious work-up pending but GI suggest starting empiric acyclovir for potential HSV hepatitis.  We will start today and follow-up serology.  Continue with NAC protocol and follow LFTs going forward.    Acute renal failure: Etiology likely prerenal in the setting of low flow state and potential GI bleed.  Volume resuscitated with both fluids and blood products overnight with slight improvement in creatinine however urine output is essentially anuric.  Nephrology consulted, appreciate their assistance.  No urgent need for renal replacement therapy however I suspect he will likely need dialysis for either his ongoing acidosis or volume overload.  Continue with bicarb drip for now.  Getting albumin challenge for potential hepatorenal syndrome.    Hypoxemic respiratory failure: Overnight has required BiPAP for increased work of breathing.  Later this afternoon, patient became increasingly tachypneic with minute ventilations of close to 25 L/min.  Intubated this afternoon after  discussion with patient.  Follow blood gases.  CT chest this afternoon shows bibasilar consolidative opacities and moderate sized bilateral pleural effusions most consistent with pulmonary edema or volume overload.  Broaden antibiotics to vancomycin and Zosyn.    Shock: Etiology not entirely clear but less concerned for a septic picture.  Has only required pressors following intubation suggesting a component of sedation versus the effects of positive pressure.  Volume resuscitating with blood products (see below).  Follow lactate and continue with broad-spectrum antibiotics.    Anion gap metabolic acidosis: Unclear if etiology is only acute kidney injury as his degree of acidosis is quite profound to be just attributed to that.  Other toxins cannot be entirely ruled out and will send for volatile screen.  Continue with bicarb drip.    Nasal bleeding: Patient presented to the hospital with a nasal balloon that had been in for roughly 8-9 days.  ENT consulted and suggested leaving the balloon in as there is less risk for toxic shock syndrome that would be seen with a nasal tampon.  Antibiotics as above.  Holding on adding clindamycin given less concern for toxic shock syndrome.    Pancytopenia: Continues to have worsening pancytopenia of unclear etiology.  Hematology was consulted and thinks this is likely related to his ongoing liver failure, critical illness and possible infection.  Peripheral smear shows rare schistocytes and is not consistent with TTP.  Plan to transfuse for hemoglobin greater > 7 and platelets > 20.    Coagulopathy: worsened throughout the day.  Initially presented with a fibrinogen > 1000 now < 61 in the setting of worsening INR suggesting possible component of DIC versus related to acute liver injury.  Receiving FFP, cryoprecipitate and IV vitamin K.    Elevated ferritin: Admission ferritin greater than 91k.  HLH considered but appreciate hematology's input. TG and soluble IL2 level pending.      Diffuse rash: petechial rash initially noted on back and abdomen now more profuse. Likely related to thrombocytopenia and coagulopathy.    Disposition: ICU    Rest per resident note from today.     The patient was seen and examined with the resident/fellow physician.  We have discussed the patient in detail and I agree with the findings, assessment, and plan as documented in their note from today. The plan was formulated in conjunction with pharmacy, ICU nurses, and respiratory therapist. I have personally reviewed today's vital signs, medications, laboratory and imaging results. I have reviewed all consults that have been ordered and are active for this patient.      Critical Care Time: 60 min. I spent this time (excluding procedures) personally providing and directing critical care services at the bedside and on the critical care unit.      Date of Service (when I saw the patient): 07/20/20    Mychal Greer MD   of Medicine  Division of Pulmonary, Allergy, Critical Care and Sleep Medicine   [facility]  Pager: [phone]

## 2020-07-20 NOTE — PLAN OF CARE
Admitted/transferred from: ED  Reason for admission/transfer: GI bleed, Metabolic acidosis  Patient status upon admission/transfer: On room air and tachypneic, hemodynamically stable otherwise, slightly hyporthermic. On bicarb gtt, RBC infusing. Hmong speaking.   Interventions: 2 grams of calcium gluconate given, step 1 of NAC initiated, D10 started to maintain BG above 70. Phan catheter placed for strict I and O. Bear hugger placed for hypothermia. Ipad  utilized to communicate w/ pt as he is Hmong speaking.   Plan: Continue to monitor coags, acidosis, update MICU w/ changes.   2 RN skin assessment: completed by Brent Sarmiento  Result of skin assessment and interventions/actions: Diffuse rash present throughout the patients body, no open areas. Tampon packing in right nare. Encouraged turning and repositioning per pt stability.   Height, weight, drug calc weight: Done  Patient belongings (see Flowsheet - Adult Profile for details): Watch, cell phone kept in pt room in safe.   MDRO education (if applicable): N/A

## 2020-07-20 NOTE — PROCEDURES
[facility], [place]    Triple Lumen PICC Placement    Date/Time: 7/20/2020 12:40 PM  Performed by: Jesse Garber RN  Authorized by: Ghada Mir MD   Indications: vascular access    UNIVERSAL PROTOCOL   Site Marked: Yes  Prior Images Obtained and Reviewed:  Yes  Required items: Required blood products, implants, devices and special equipment available    Patient identity confirmed:  Verbally with patient  NA - No sedation, light sedation, or local anesthesia  Confirmation Checklist:  Patient's identity using two indicators, relevant allergies, procedure was appropriate and matched the consent or emergent situation and correct equipment/implants were available  Time out: Immediately prior to the procedure a time out was called    Universal Protocol: the Joint Commission Universal Protocol was followed    Preparation: Patient was prepped and draped in usual sterile fashion           ANESTHESIA    Anesthesia: Local infiltration  Local Anesthetic:  Lidocaine 1% without epinephrine  Anesthetic Total (mL):  5      SEDATION    Patient Sedated: No        Preparation: skin prepped with ChloraPrep  Skin prep agent: skin prep agent completely dried prior to procedure  Sterile barriers: maximum sterile barriers were used: cap, mask, sterile gown, sterile gloves, and large sterile sheet  Hand hygiene: hand hygiene performed prior to central venous catheter insertion  Catheter type: triple lumen  Lumen type: non-valved  Catheter size: 6 Fr  Brand: other (see comment) (BIOFLO)  Placement method: venipuncture, MST, ultrasound and tip confirmation system  Number of attempts: 1  Successful placement: yes  Orientation: right  Location: basilic vein (VD 0.48 CM)  Arm circumference: adults 10 cm  Extremity circumference: 23  Visible catheter length: 0  Total catheter length: 32  Dressing and securement: chlorhexidine disc applied, blood cleaned with CHG, dressing applied, glue, line  secured, securement device, site cleaned, statlock and sterile dressing applied  Post procedure assessment: blood return through all ports and placement verified by x-ray  PROCEDURE   Patient Tolerance:  Patient tolerated the procedure well with no immediate complications  Describe Procedure: picc OK TO USE

## 2020-07-20 NOTE — PROCEDURES
[facility], [place]    Arterial line placement    Date/Time: 7/20/2020 3:04 PM  Performed by: Varsha Hester MD  Authorized by: Varsha Hester MD     UNIVERSAL PROTOCOL   Site Marked: Yes  Prior Images Obtained and Reviewed:  Yes  Required items: Required blood products, implants, devices and special equipment available    Patient identity confirmed:  Arm band  Patient was reevaluated immediately before administering moderate or deep sedation or anesthesia  Confirmation Checklist:  Patient's identity using two indicators, procedure was appropriate and matched the consent or emergent situation and correct equipment/implants were available  Time out: Immediately prior to the procedure a time out was called    Universal Protocol: the Joint Commission Universal Protocol was followed    Preparation: Patient was prepped and draped in usual sterile fashion    ESBL (mL):  1      Indication: multiple ABGs respiratory failure hemodynamic monitoring  Location:  Left femoral      SEDATION    Patient Sedated: Yes    Sedation:  Propofol  Vital signs: Vital signs monitored during sedation      PROCEDURE DETAILS          Number of Attempts:  1  Post-procedure:  Line sutured and dressing applied  CMS: normal  PROCEDURE   Patient Tolerance:  Patient tolerated the procedure well with no immediate complications    Length of time physician/provider present for 1:1 monitoring during sedation: 20

## 2020-07-20 NOTE — CONSULTS
Nephrology Initial Consult  July 20, 2020      Ivanna Espinoza MRN:[id number] YOB: 1970  Date of Admission: 7/19/2020  Primary care provider: No Ref-Primary, Physician  Requesting physician: Ghada Mir MD    ASSESSMENT AND RECOMMENDATIONS:  Anuric AZAM  Etiology possibly pre-renal in the setting of presentation with GI bleed although patient not noted to be significantly hypotensive since arrival but could have had hemodynamic instability prior. Cr improved with resuscitation which also supports likely pre-renal etiology. Urinalysis probably represents ATN with cellular debris and proteinuria. Will further evaluate via formal US.    Hyperphosphatemia  Hypocalcemia  Unclear etiology, would expect hypercalcemia with acidosis rather than low Ca. Lipase is also normal ruling out pancreatitis as a cause. Hyperphosphatemia likely contributing. Will evaluate further with PTH and vit D levels and treat hyperphosphatemia with non-calcium based phosphate binder.    Metabolic acidosis with respiratory alkalosis compensation  Presented with pH 7.22, improved after initiation of BiPAP with respiratory compensation and bicarbonate replacement. Etiology of acidosis likely in the setting of AZAM. Will recommend continuation of bicarb drip and frequent monitoring for now. No urgent indication for dialysis currently.    Pancytopenia with significantly elevated ferritin  Unclear is this can be explained from acute liver injury and would recommend heme/onc consultation for further elevation.    RECOMMENDATIONS:  - No urgent indication for renal replacement therapy; consent obtained over the phone from patient's sister Lexis Conte and placed in chart.  - Continue to monitor BMP and blood gases q6-8h  - Continue bicarb drip at 150 ml/hr  - Continue to replace calcium prn  - Agree with albumin challenge  - Please involve heme/onc  - Please obtain formal abdominal US  - Please obtain PTH, 25-OH vit D and 1,25-OH vit D  levels (ordered for you)  - Please start sevelamer 800 TID    Recommendations were communicated to primary team via this note    Seen and discussed with Dr. Zana Chatman MD  Renal Fellow  [phone]    REASON FOR CONSULT: AZAM and acidosis    HISTORY OF PRESENT ILLNESS:  Admitting provider and nursing notes reviewed  Ivanna Espinoza is a 50 year old with unknown PMH who was admitted with GI bleed and acute liver failure. Also found to have AZAM. Per family report patient just moved from [place] and had been admitted there at [facility] in [place] for a few weeks related to liver issues. Unable to obtain history from patient as he is intubated.    PAST MEDICAL HISTORY:  Unable to review with patient as intubated  No past medical history on file.   Recent admission due to liver issues in [place], [place]    Past Surgical History:   Procedure Laterality Date     MIDLINE DOUBLE LUMEN PLACEMENT Right 07/20/2020    [place]      MEDICATIONS:  PTA Meds  Prior to Admission medications    Not on File      Current Meds    albumin human  50 g Intravenous Daily     heparin lock flush  5-15 mL Intracatheter Q24H     pantoprazole (PROTONIX) IV  40 mg Intravenous BID     [START ON 7/21/2020] phytonadione  10 mg Intravenous Daily     piperacillin-tazobactam  2.25 g Intravenous Q6H     sodium chloride (PF)  10 mL Intracatheter Q8H     Infusion Meds    acetylcysteine (ACETADOTE) infusion *second dose* 2.52 g (07/20/20 0738)     acetylcysteine (ACETADOTE) infusion *third dose - elevated AST*       dextrose 10% 75 mL/hr at 07/20/20 1100     octreotide (sandoSTATIN) infusion ADULT       oxymetazoline       sodium bicabonate in water for infusion 150 mL/hr at 07/20/20 0838     ALLERGIES:    No Known Allergies    REVIEW OF SYSTEMS:  Unable to review with patient as intubated    SOCIAL HISTORY:  Social History     Socioeconomic History     Marital status:      Spouse name: Not on file     Number of children: Not on  "file     Years of education: Not on file     Highest education level: Not on file   Occupational History     Not on file   Social Needs     Financial resource strain: Not on file     Food insecurity     Worry: Not on file     Inability: Not on file     Transportation needs     Medical: Not on file     Non-medical: Not on file   Tobacco Use     Smoking status: Not on file   Substance and Sexual Activity     Alcohol use: Not on file     Drug use: Not on file     Sexual activity: Not on file   Lifestyle     Physical activity     Days per week: Not on file     Minutes per session: Not on file     Stress: Not on file   Relationships     Social connections     Talks on phone: Not on file     Gets together: Not on file     Attends Church service: Not on file     Active member of club or organization: Not on file     Attends meetings of clubs or organizations: Not on file     Relationship status: Not on file     Intimate partner violence     Fear of current or ex partner: Not on file     Emotionally abused: Not on file     Physically abused: Not on file     Forced sexual activity: Not on file   Other Topics Concern     Not on file   Social History Narrative     Not on file     FAMILY MEDICAL HISTORY:   Unable to review with patient as intubated    PHYSICAL EXAM:   Temp  Av.7  F (36.5  C)  Min: 97.3  F (36.3  C)  Max: 98.2  F (36.8  C)  Arterial Line MAP (mmHg)  Av mmHg  Min: 30 mmHg  Max: 30 mmHg      Pulse  Av.5  Min: 71  Max: 91 Resp  Av.9  Min: 18  Max: 34  FiO2 (%)  Av %  Min: 30 %  Max: 30 %  SpO2  Av.7 %  Min: 93 %  Max: 100 %       BP 95/69   Pulse 71   Temp 97.6  F (36.4  C)   Resp 30   Ht 1.52 m (4' 11.84\")   Wt 50.4 kg (111 lb 1.8 oz)   SpO2 95%   BMI 21.81 kg/m     Date 20 07 - 20 0659   Shift 2211-0114 6296-0895 5991-9615 24 Hour Total   INTAKE   I.V. 1973.   Colloid 200   200   Blood Components 309   309   Shift Total(mL/kg) 2482.01(49.25)   " 2482.01(49.25)   OUTPUT   Urine 45   45   Shift Total(mL/kg) 45(0.89)   45(0.89)   Weight (kg) 50.4 50.4 50.4 50.4      Admit Weight: 50.4 kg (111 lb 1.8 oz)     GENERAL APPEARANCE: Sedated, intubated  EYES: scleral icterus present, pupils equal  Pulmonary: lungs clear to auscultation with equal breath sounds bilaterally  CV: regular rhythm, normal rate, no rub   - Edema bilateral 1+  GI: soft, nontender, normal bowel sounds  : mullins present  SKIN: no rash, warm, dry, icteric  NEURO: Sedated    LABS:   CMP  Recent Labs   Lab 07/20/20  0952 07/20/20  0645 07/20/20  0345 07/20/20  0202 07/20/20  0050 07/19/20  2233     --  130* 132* 127* 128*   POTASSIUM 4.6  --  5.2 5.5* 5.3 5.4*   CHLORIDE 102  --  105 106  --  106   CO2 12*  --  10* 10*  --  7*   ANIONGAP 19*  --  14 16*  --  15*   GLC 67*  --  75 72 134* 65*   BUN 97*  --  99* 100*  --  92*   CR 6.05*  --  6.42* 7.01*  --  6.80*   GFRESTIMATED 10*  --  9* 8*  --  9*   GFRESTBLACK 12*  --  11* 10*  --  10*   TAI <5.0*  --  5.2* 5.1*  --  5.1*   MAG  --   --  2.8*  --   --   --    PHOS 9.3*  --   --   --   --   --    PROTTOTAL 3.5*  --  3.7*  --   --  3.9*   ALBUMIN 1.2*  --  1.4*  --   --  1.4*   BILITOTAL 17.6* 17.3* 18.0*  --   --  19.7*   ALKPHOS 536*  --  608*  --   --  653*   AST 5,392*  --  6,002*  --   --  6,396*   ALT 1,794*  --  1,860*  --   --  1,969*     CBC  Recent Labs   Lab 07/20/20  0952 07/20/20  0345 07/20/20  0050 07/19/20 2233   HGB 7.0* 7.7* 7.5* 8.2*   WBC 1.7* 2.1*  --  2.5*   RBC 2.60* 2.82*  --  3.05*   HCT 20.9* 22.7*  --  24.8*   MCV 80 81  --  81   MCH 26.9 27.3  --  26.9   MCHC 33.5 33.9  --  33.1   RDW 16.4* 16.7*  --  16.9*   PLT 16* 57*  --  67*     INR  Recent Labs   Lab 07/20/20 0345 07/19/20 2233   INR 5.35* 8.05*   PTT  --  212*     ABG  Recent Labs   Lab 07/20/20  0952 07/20/20  0455 07/20/20 0345   PH  --  7.40  --    PCO2  --  14*  --    PO2  --  83  --    HCO3  --  9*  --    O2PER 2L 21.0 21.0      URINE  STUDIES  Recent Labs   Lab Test 07/20/20  0526   COLOR Dark Brown   APPEARANCE Slightly Cloudy   URINEGLC Negative   URINEBILI Large*   URINEKETONE Negative   SG 1.014   UBLD Large*   URINEPH 5.5   PROTEIN 100*   NITRITE Negative   LEUKEST Trace*   RBCU 82*   WBCU 10*     No lab results found.  PTH  No lab results found.  IRON STUDIES  Recent Labs   Lab Test 07/20/20  0952 07/20/20  0645   IRON 172 168   * 89*   IRONSAT 146* 189*   LIZA  --  91,297*     IMAGING:  Formal US Abd/Pelvis pending  On limited exam R kidney with normal echotexture without focal mass. No hydronephrosis or nephrolithiasis. The craniocaudal dimensions 10.7 cm.    Alyssa Chatman MD

## 2020-07-20 NOTE — CONSULTS
Otolaryngology Consult Note  July 20, 2020      CC: Epistaxis management    HPI: Ivanna Espinoza is a 50 year old Hmong-speaking male with no known past medical history who was recently admitted at [facility] in [place], [place] with liver failure who presented to [facility] with SOB, hematochezia, hematemesis found to have acute liver failure and acute renal failure. His INR was 8 on admission. He was admitted to the ICU and intubated for airway protection. ENT was consulted due to presence of nasal packing in the right nostril on admission. Per report from ICU team, patient stated packing was placed in [place] for a nose bleed 8-9 days ago. He did not have any bleeding visualized from the nose on this admission. Hgb has dropped from 8.2 to 7.0 since admission last night. He is receiving blood products per ICU (platelets, FFP, PRBCs).     On admission patient reported bloody stools, a few episodes of vomiting bright red blood, chest pain for the past 3-4 days, and rash on his back for the past 4-5 days. He was his usual state of health 2 weeks ago prior to his trip to [place].     Patient intubated on encounter, all HPI obtained from chart and primary team.     No past medical history on file.    Past Surgical History:   Procedure Laterality Date     MIDLINE DOUBLE LUMEN PLACEMENT Right 07/20/2020    [facility], [place]       No current outpatient medications on file.        No Known Allergies    Social History     Socioeconomic History     Marital status:      Spouse name: Not on file     Number of children: Not on file     Years of education: Not on file     Highest education level: Not on file   Occupational History     Not on file   Social Needs     Financial resource strain: Not on file     Food insecurity     Worry: Not on file     Inability: Not on file     Transportation needs     Medical: Not on file     Non-medical: Not on file   Tobacco Use     Smoking status: Not on file   Substance and Sexual Activity      "Alcohol use: Not on file     Drug use: Not on file     Sexual activity: Not on file   Lifestyle     Physical activity     Days per week: Not on file     Minutes per session: Not on file     Stress: Not on file   Relationships     Social connections     Talks on phone: Not on file     Gets together: Not on file     Attends [employer] service: Not on file     Active member of club or organization: Not on file     Attends meetings of clubs or organizations: Not on file     Relationship status: Not on file     Intimate partner violence     Fear of current or ex partner: Not on file     Emotionally abused: Not on file     Physically abused: Not on file     Forced sexual activity: Not on file   Other Topics Concern     Not on file   Social History Narrative     Not on file       No family history on file.    ROS: 12 point review of systems is negative unless noted in HPI.    PHYSICAL EXAM:  General: laying in bed, sedated  BP 95/69   Pulse 71   Temp 97.6  F (36.4  C)   Resp 30   Ht 1.52 m (4' 11.84\")   Wt 50.4 kg (111 lb 1.8 oz)   SpO2 94%   BMI 21.81 kg/m    HEAD: normocephalic, atraumatic  Face: symmetrical, no swelling, edema, or erythema.   Nose: No bleeding anteriorly from the nose. Rapid rhino balloon nasal packing in place in the right nasal passage. The balloon is easily compressible. Small amount of dried blood around the nasal opening on the right. Visible portion of the anterior septum with no signs of necrosis or pressure injury.  Mouth: ETT in place. Membranes moist, no ulcers,tongue midline and symmetric. No sites of active bleeding within the oral cavity  Oropharynx: Blood pooling in the oropharynx, uvula midline. Oropharynx suctioned clear and there is an area of excoriation along the posterior oropharyngeal wall just behind the uvula that is persistently oozing. No blood noted to be dripping down from the nasopharynx. The oropharynx was packed with Afrin soaked Kerlix gauze roll, remaining roll left " outside of the mouth.   Neck: no LAD, trachea midline  Neuro: sedated  Respiratory: mechanically ventilated    ROUTINE IP LABS (Last four results)  BMP  Recent Labs   Lab 07/20/20  0952 07/20/20  0345 07/20/20  0202 07/20/20 0050 07/19/20  2233    130* 132* 127* 128*   POTASSIUM 4.6 5.2 5.5* 5.3 5.4*   CHLORIDE 102 105 106  --  106   TAI <5.0* 5.2* 5.1*  --  5.1*   CO2 12* 10* 10*  --  7*   BUN 97* 99* 100*  --  92*   CR 6.05* 6.42* 7.01*  --  6.80*   GLC 67* 75 72 134* 65*     CBC  Recent Labs   Lab 07/20/20  0952 07/20/20  0345 07/20/20 0050 07/19/20  2233   WBC 1.7* 2.1*  --  2.5*   RBC 2.60* 2.82*  --  3.05*   HGB 7.0* 7.7* 7.5* 8.2*   HCT 20.9* 22.7*  --  24.8*   MCV 80 81  --  81   MCH 26.9 27.3  --  26.9   MCHC 33.5 33.9  --  33.1   RDW 16.4* 16.7*  --  16.9*   PLT 16* 57*  --  67*     INR  Recent Labs   Lab 07/20/20 0345 07/19/20  2233   INR 5.35* 8.05*       Assessment and Plan  Ivanna Espinoza is a 50 year old Hmong-speaking male with no known past medical history currently admitted with severe acute liver injury and acute kidney injury. ENT was consulted for epistaxis management given presence of nasal packing on admission that was reportedly placed 8-9 days ago. No bleeding from the nose at this time, however, excoriation noted along the posterior oropharyngeal wall, likely 2/2 intubation trauma. The oropharynx was packed with Afrin soaked kerlix gauze roll. Decision made to leave the Rapid Rhino nasal balloon packing in place at this time given patient's tenuous status and severe coagulopathy. There does not appear to be any signs of septal necrosis/pressure injury from the packing at this time, balloon is easily compressible and not overinflated.     - Keep oropharyngeal packing in place for now. ENT to reassess daily and will remove in 1-3 days pending clinical picture.   - Apply Afrin to oropharyngeal gauze Q4H and PRN for bleeding. Keep packing moist at all times with Afrin and/or saline  -  Nasal saline spray Q2H to b/l nasal passages to keep moist  - ENT will remove nasal packing once patient is more stable. Continue antibiotics to cover staph/TSS while nasal packing and oropharyngeal packing remain in place. Currently receiving IV Zosyn  - Remainder of care per primary team, please do not hesitate to contact ENT with questions/concerns.     -- Patient and above plan discussed with ENT staff on call, Dr. Colindres.     Saba Arrington PA-C  Otolaryngology-Head & Neck Surgery  Please page ENT with questions by dialing [phone] and entering job code [phone] when prompted.

## 2020-07-20 NOTE — PHARMACY-CONSULT NOTE
Pharmacotherapy Consult    Asked to dose phytonadione for reversal of INR in setting of liver failure.  INR=8.05. Patient is not on any known anticoagulation. Recommend administer phytonadione 10mg IV x1 in ED. Could consider this dose daily x3 days.     Alen Porter, PharmD, BCPS  ASCOM [id number]

## 2020-07-20 NOTE — ED PROVIDER NOTES
"ED Provider Note  [facility]      History     Chief Complaint   Patient presents with     Shortness of Breath     The history is provided by the patient and medical records. A  was used ([employer] ).     Ivanna Espinoza is a 50 year old male without reported past medical history who presents here to the Emergency Department due to shortness of breath, bloody stool, and hematemesis. Patient states that he was recently hospitalized at [facility] in [place], [place] for two weeks due to similar symptoms. He reports that he was discharged 2-3 days ago. Patient states that while in hospital, he was told he has a liver problem, but he does not know the cause of the liver prblem. Today, patient states that he has been experiencing dark, bloody, loose stool 4-5 times today. He reports difficulty breathing. He states he feels nauseous, and has had a few episodes of hematemesis with bright red blood. Patient reports he has bryn having a fever of 100.4 F \"once a day\". Patient reports chest pain for the past 3-4 days. Patient states he has a rash on his back for 4-5 days. Patient states he has a tube in his nose (nasal tampon)  for 8-9 days as his nose was bleeding. Patient denies having heart problems or DM. Patient denies coughing.  Patient had been living in [facility] until about 1 year ago when he moved to [facility] to live with his sister.  He states he has been feeling well before going to [place] just over 2 weeks ago.  Patient denies history of kidney disease or other past medical problems.  He denies any excessive Tylenol use other than taking 1 to 2 tablets/day occasionally for fever.  Denies regular alcohol use.  He denies recent medication overdose or other possible toxicity that might of caused liver disease.    Past Medical History  No past medical history on file.  No past surgical history on file.  No current outpatient medications " on file.    No Known Allergies  Past medical history, past surgical history, medications, and allergies were reviewed with the patient. Additional pertinent items: None    Family History  No family history on file.      Social History  Social History     Tobacco Use     Smoking status: Not on file   Substance Use Topics     Alcohol use: Not on file     Drug use: Not on file      Social history was reviewed with the patient. Additional pertinent items: None    Review of Systems   HENT: Positive for nosebleeds.    Respiratory: Positive for shortness of breath. Negative for cough.    Cardiovascular: Positive for chest pain.   Gastrointestinal: Positive for blood in stool, nausea and vomiting (hematemesis).   Skin: Positive for rash (back).   All other systems reviewed and are negative.    A complete review of systems was performed with pertinent positives and negatives noted in the HPI, and all other systems negative.    Physical Exam   BP: 111/83  Pulse: 76  Heart Rate: 78  Temp: 98.2  F (36.8  C)  Resp: 28  SpO2: 100 %  Physical Exam  GEN: Patient appears fatigued, has jaundice and scleral icterus  HEENT:  EOMI, Mucous membranes are moist.   Cardio:  RRR, no murmur, radial pulses equal bilaterally  PULM:  Lungs clear, good air movement, no wheezes, rales, has tachypnea   Abd:  Soft, normal bowel sounds, no focal tenderness  Musculoskeletal:  normal range of motion of the extremities, no lower extremity swelling or calf tenderness  Neuro:  Alert and oriented X3, Follows commands, moving all extremities spontaneously   Skin:  Warm, dry, there is a petechial rash all over the patient's back and on his arms.  Jaundice is present as noted above.  ED Course     10:30 PM  The patient was seen and examined by Emmie Rodrigues MD in Room ED18.    Procedures             EKG Interpretation:      Interpreted by Emmie Rodrigues MD  Time reviewed: 00:20  Symptoms at time of EKG: chest discomfort, GI bleed   Rhythm:  normal sinus   Rate: normal  Axis: normal  Ectopy: none  Conduction: Prolonged QT of 502 ms  ST Segments/ T Waves: No ST-T wave changes, low voltage QRS  Q Waves: III, aVF  Comparison to prior: No old EKG available    Clinical Impression: Sinus rhythm with low voltage QRS, prolonged QT    Labs are shown below.  Chest x-ray was reviewed by me and results are shown below.  Patient has markedly abnormal labs.  At this time, I do not have previous labs to compare to.  We are working on getting the patient's hospital records from [facility] in [facility].  I suspect that the patient had acute hepatitis/liver failure noted during his previous hospitalization.  He states he was discharged only 2 to 3 days ago, so I suspect that the liver failure was present at that time as well.  It would be extremely helpful to be able to see the work-up that the patient had done at this outside facility.  At this time, patient's labs are showing hepatitis, acute renal failure, anemia, pancytopenia, hyponatremia, hyperkalemia, hypoglycemia, uremia, he is severely coagulopathic with high INR, likely due to his liver failure.  There is a metabolic acidosis with very low bicarb and an anion gap, pH of 7.22.    Patient was given vitamin K due to his elevated INR with GI bleed.  He was given 1 amp of D50 for his hypoglycemia and his glucose did increase to normal levels.  Patient was given an amp of bicarb as well as started on a bicarb drip for his metabolic acidosis.  I suspect he has an respiratory alkalosis causing his significant tachypnea with respiratory rates close to 30.  Patient will be started on BiPAP to assist with breathing because of the significant anion gap metabolic acidosis and concern for the patient getting extremely tired from his tachypnea.  Patient will be given IV calcium gluconate for his hypocalcemia.  Although I am not highly suspecting Tylenol toxicity, because the patient denies taking significant  amounts of Tylenol, starting N-acetylcysteine will not be harmful, so at this time we will start N-acetylcysteine.       Critical Care Addendum    My initial assessment, based on my review of nursing observations, review of vital signs, focused history, physical exam, 12 lead ECG analysis, discussion with nephrology and ICU, interpretation of labs  and and CXR, established that Ivanna Espinoza has and severe hepatitis with lover failure, renal failure, GI bleed, which requires immediate intervention, and therefore he is critically ill.     After the initial assessment, the care team initiated multiple lab tests, initiated IV fluid administration, initiated medication therapy with D50, bicarb, Vitamin K, acetylcystine, blood, initiated intensive non-invasive respiratory support and consulted with nephrology to provide stabilization care. Due to the critical nature of this patient, I reassessed nursing observations, vital signs, physical exam, interpretation of more labs, mental status and respiratory status multiple times prior to his disposition.     Time also spent performing documentation, reviewing test results, discussion with consultants, coordination of care and and attempting to get medical records from [facility].     Critical care time (excluding teaching time and procedures): 120 minutes.        Results for orders placed or performed during the hospital encounter of 07/19/20   XR Chest Port 1 View     Status: None    Narrative    EXAM: XR CHEST PORT 1 VW  LOCATION: [facility]  DATE/TIME: 7/19/2020 11:44 PM    INDICATION: ; shortness of breath;  COMPARISON: None.      Impression    IMPRESSION: Mild atelectasis at the left base medially. No evidence for CHF or pneumonia. No pleural effusion or pneumothorax.  Normal heart size.   CBC with platelets differential     Status: Abnormal   Result Value Ref Range    WBC 2.5 (L) 4.0 - 11.0 10e9/L    RBC Count 3.05 (L) 4.4 - 5.9 10e12/L     Hemoglobin 8.2 (L) 13.3 - 17.7 g/dL    Hematocrit 24.8 (L) 40.0 - 53.0 %    MCV 81 78 - 100 fl    MCH 26.9 26.5 - 33.0 pg    MCHC 33.1 31.5 - 36.5 g/dL    RDW 16.9 (H) 10.0 - 15.0 %    Platelet Count 67 (L) 150 - 450 10e9/L    Diff Method Manual Differential     % Neutrophils 65.0 %    % Lymphocytes 27.0 %    % Monocytes 3.0 %    % Eosinophils 5.0 %    % Basophils 0.0 %    Nucleated RBCs 23 (H) 0 /100    Absolute Neutrophil 1.6 1.6 - 8.3 10e9/L    Absolute Lymphocytes 0.7 (L) 0.8 - 5.3 10e9/L    Absolute Monocytes 0.1 0.0 - 1.3 10e9/L    Absolute Eosinophils 0.1 0.0 - 0.7 10e9/L    Absolute Basophils 0.0 0.0 - 0.2 10e9/L    Absolute Nucleated RBC 0.6     Poikilocytosis Marked     Ovalocytes Slight     Xiang Cells Marked     Platelet Estimate Confirming automated cell count    Comprehensive metabolic panel     Status: Abnormal   Result Value Ref Range    Sodium 128 (L) 133 - 144 mmol/L    Potassium 5.4 (H) 3.4 - 5.3 mmol/L    Chloride 106 94 - 109 mmol/L    Carbon Dioxide 7 (LL) 20 - 32 mmol/L    Anion Gap 15 (H) 3 - 14 mmol/L    Glucose 65 (L) 70 - 99 mg/dL    Urea Nitrogen 92 (H) 7 - 30 mg/dL    Creatinine 6.80 (H) 0.66 - 1.25 mg/dL    GFR Estimate 9 (L) >60 mL/min/[1.73_m2]    GFR Estimate If Black 10 (L) >60 mL/min/[1.73_m2]    Calcium 5.1 (LL) 8.5 - 10.1 mg/dL    Bilirubin Total 19.7 (HH) 0.2 - 1.3 mg/dL    Albumin 1.4 (L) 3.4 - 5.0 g/dL    Protein Total 3.9 (L) 6.8 - 8.8 g/dL    Alkaline Phosphatase 653 (H) 40 - 150 U/L    ALT 1,969 (HH) 0 - 70 U/L    AST 6,396 (HH) 0 - 45 U/L   INR     Status: Abnormal   Result Value Ref Range    INR 8.05 (HH) 0.86 - 1.14   PTT     Status: Abnormal   Result Value Ref Range     (HH) 22 - 37 sec   Troponin I     Status: None   Result Value Ref Range    Troponin I ES 0.025 0.000 - 0.045 ug/L   Lactic acid     Status: None   Result Value Ref Range    Lactic Acid 1.6 0.7 - 2.0 mmol/L   CK total     Status: Abnormal   Result Value Ref Range    CK Total 716 (H) 30 - 300 U/L    Acetaminophen level     Status: None   Result Value Ref Range    Acetaminophen Level Canceled, Test credited mg/L   Salicylate level     Status: None   Result Value Ref Range    Salicylate Level Canceled, Test credited mg/dL   EKG 12 lead     Status: None (Preliminary result)   Result Value Ref Range    Interpretation ECG Click View Image link to view waveform and result    ISTAT gases elec ica gluc suki POCT     Status: Abnormal   Result Value Ref Range    Ph Venous 7.22 (L) 7.32 - 7.43 pH    PCO2 Venous 16 (LL) 40 - 50 mm Hg    PO2 Venous 36 25 - 47 mm Hg    Bicarbonate Venous 7 (LL) 21 - 28 mmol/L    O2 Sat Venous 61 %    Sodium 127 (L) 133 - 144 mmol/L    Potassium 5.3 3.4 - 5.3 mmol/L    Glucose 134 (H) 70 - 99 mg/dL    Calcium Ionized 2.9 (LL) 4.4 - 5.2 mg/dL    Hemoglobin 7.5 (L) 13.3 - 17.7 g/dL    Hematocrit - POCT 22 (L) 40.0 - 53.0 %PCV   ABO/Rh type and screen     Status: None   Result Value Ref Range    ABO O     RH(D) Pos     Antibody Screen Neg     Test Valid Only At          [facility]    Specimen Expires 07/22/2020      Medications   sodium bicarbonate 150 mEq in water for infusion ( Intravenous New Bag 7/20/20 0118)   acetylcysteine (ACETADOTE) 0.15 g/kg in sodium chloride 0.9 % 200 mL STEP ONE infusion (has no administration in time range)   acetylcysteine (ACETADOTE) 0.05 g/kg in sodium chloride 0.9 % 500 mL STEP TWO infusion (has no administration in time range)   acetylcysteine (ACETADOTE) 6,000 mg in sodium chloride 0.9 % 500 mL STEP THREE infusion (has no administration in time range)   calcium gluconate in D5W 100 mL intermittent infusion 1 g (has no administration in time range)   dextrose 50 % injection 50 mL (50 mLs Intravenous Given 7/19/20 2626)   phytonadione (AQUA-MEPHYTON) 10 mg in sodium chloride 0.9 % 50 mL intermittent infusion (0 mg Intravenous Stopped 7/20/20 0048)   sodium bicarbonate 8.4 % injection 50 mEq (50 mEq Intravenous  Given 7/20/20 0124)        Assessments & Plan (with Medical Decision Making)   Patient presents with symptoms of GI bleed with blood in the stool in the vomit as well as shortness of breath.  His work-up in the emergency department has revealed critical illness with hepatic toxicity, liver failure, acute renal failure with uremia, hypoglycemia, hyponatremia, hypoglycemia, acute anion gap metabolic acidosis, pancytopenia.  Patient has remained hemodynamically stable up to this point with normal blood pressure and heart rate.  He is tachypneic, likely due to respiratory alkalosis and was placed on BiPAP for respiratory assistance.  Patient had a drop in his hemoglobin after getting initial fluid boluses and will order blood transfusion.  Patient will be admitted to the ICU for ongoing resuscitation, further work-up and aggressive care.  At this time, the cause for his liver failure is not known.  He reports a 2-week hospital admission in [facility] and was discharged just 2 to 3 days ago.  I suspect that the records from that hospitalization will provide a wealth of information regarding his condition.    I have reviewed the nursing notes. I have reviewed the findings, diagnosis, plan and need for follow up with the patient.    New Prescriptions    No medications on file       Final diagnoses:   Gastrointestinal hemorrhage with melena   Gastrointestinal hemorrhage with hematemesis   Acute renal failure, unspecified acute renal failure type (H)   Hepatitis   I, Rehana Myers, am serving as a trained medical scribe to document services personally performed by Emmie Rodrigues MD, based on the provider's statements to me.     I, Emmie Rodrigues MD, was physically present and have reviewed and verified the accuracy of this note documented by Rehana Myers.     --  Emmie Rodrigues MD   Emergency Medicine   [facility] EMERGENCY DEPARTMENT  7/19/2020     Emmie Rodrigues,  MD  07/20/20 0150

## 2020-07-20 NOTE — PROGRESS NOTES
MEDICAL ICU PROGRESS NOTE  07/20/2020    Date of Hospital Admission: 7/20/2020  Date of ICU Admission: 7/20/2020  Reason for Critical Care Admission: Renal failure; Liver failure w/ SOB, hematochezia, hematemesis  Date of Service (when I saw the patient): 07/20/2020    ASSESSMENT: Ivanna Espinoza is a 50 year old male with unknown past medical history recently admitted at [facility] in [place], [place] for reported liver failure (unclear on details) presented to [facility] ED on 7/19 with SOB, hematochezia, and hematemesis and was then found to have acute liver injury and acute renal failure. Admitted to the MICU from the ED overnight 7/19-7/20.    CHANGES and MAJOR THINGS TODAY:   - Intubated   - Resuscitative/Repletions: Vit K 10g (x2 ??again today), 2 U FFP, 10 U cryoprecipitate, 1 U platelets, Calcium gluconate (6 grams total), Calcium chloride (2 grams total).  - Labs: EtOH, soluble IL-2Ra, CBC, iCa (repleted multiple times), LDH, retic count, fibrinogen recheck, PTT, INR, triglycerides, ABG d8slapi, HBV, HCV, HIV, Anaplasma antibody, Rickettsia rickettsii, Histoplasma capsulatum, Coccidioides, Cryoglobulin quant, INR,  A1AT, drug/toxin levels, FeNa, volatiles screen, ethylene glycol, hepatic panel, iron studies, alpha fetoprotein, L3%, TSH, T4, Treponema antibodies, EBV, CMV IgG, ethyl glucuronide urine, Peth, PFTs, PSA, PTH, 1,25 Dihydroxyvitamin D, 25 Hydroxyvitamin D2 and D3, yeast blood culture, blood smear (w/concurrent CBC and retic), strongyloides  - Rads: CTH, CT c/a/p, U/S abd/pelvis doppler, TTE  - New meds: Acyclovir 5mg/kg q12hr, Zosyn, Vancomycin changed NaHCO3 gtt to 1:1 35mL/hr, D10 discontinued and switched to D20 (requiring more D10 and to preserve volume status)  - Consults: Transplant Surgery Liver, Hematology, Hepatology, Nephrology, ENT, , nutrition services, PT, OT      Ventilator: R 24 /  / PEEP 5 / FiO2 100  Sedation: Fentanyl and Propofol (if weaning, wean propofol first  "due to unknown triglyceride level)  Pressors: Levo (started just after intubation likely due to propofol and already soft BP prior to intubation)      PLAN:  Neuro/HEENT:  Pain: None  Sedation: Fentanyl and Propofol    Nasal bleeding  Pt has nasal balloon for 8-9 days of nose bleeding. 2/2 current coagulopathy. Started bleeding from L nare; ENT paged again.  - ENT says to keep R nasal balloon in for now      ===CARDIOVASCULAR===  #. Cardiomegaly   Noted on admission CXR.  - Followup on echo results       ===RESPIRATORY===  #. Tachypnea  Metabolic acidosis with respiratory alkalosis compensation. Working hard to compensate for metabolic acidosis. CXR on admission unremarkable. COVID-19 negative on 7/19 (low suspicion).  Was on BiPAP briefly. On room air currently, but increased WOB and feeling SOB. Ultimately intubated today for increased WOB.  - Intubated for increased WOB on BiPAP  - See labs ordered above in changes today section      ===GASTROINTESTINAL===  #. Acute liver injury  Transaminitis, total bilirubinemia, high alk phos. Recent admission at [facility] ([place], [place]) for \"liver problem\". RUQ US without any choleocystitis. HIV negative. Ceruloplasmin normal. JORGITO negative. Ammonia 70 (H).  (H). Ferritin 91,297 (H), iron 172, TIBC 117,   - Will pursue records from [facility] to see what they discovered during his admission there  - Hepatology on board  - See labs ordered in \"changes today\" section  - Treatment plan: NAC for possible tylenol overdose, Octreotide gtt, IV Protonix 40mg BID, Zosyn 2.25g q6hrs, Acyclovir, Albumin challenge x3days, Vitamin K x3days (another Vit K dose given today)    MELD-Na score: 47 at 7/20/2020  2:29 PM  MELD score: 48 at 7/20/2020  2:29 PM  Calculated from:  Serum Creatinine: 6.27 mg/dL (Rounded to 4 mg/dL) at 7/20/2020  2:29 PM  Serum Sodium: 132 mmol/L at 7/20/2020  2:29 PM  Total Bilirubin: 17.6 mg/dL at 7/20/2020  9:52 AM  INR(ratio): 4.92 at 7/20/2020  2:29 " "PM  Age: 50 years 2 months    #. Nutrition  Strict NPO     #. GI bleed  Noted clots via rectum when he came in . No prior records.  Need more history from patient off BiPAP to determine etiology and chronicity. PICC line placed for access.  - PPI IV BID  - Octreotide gtt  - Could consider central line, but coagulopathic       ===RENAL / ELECTROLYTES===  #. AZAM  Unclear baseline or etiology. Unclear if hx of kidney disease. FeNa 2.2% and high creatinine indicating early AZAM in setting of CKD or intrarenal injury.  - Nephrology consulted; no need for HD currently per nephro (if pH drops below 7.1 will need HD)  - See labs ordered in \"changes today\" section  - Avoid nephrotoxins including NSAIDs  - Albumin challenge given possible HRS  - Phan placed for strict I/Os    #. HAGMA  Likely 2/2 AZAM, cannot r/o other toxins. Lactate normal.   - Bicarb gtt per nephrology  - ABGs q6hrs     #. Hyponatremia  Appears euvolemic vs hypovolemic on exam. Unclear chronicity.  - Uosm, Usodium and Sosm ordered  - Watch Na on BMP w/ NaHCO3 on board     #. Hypocalcemia  - Replete with calcium gluconate / calcium chloride as appropriate (consider when giving blood products)      ===INFECTIOUS DISEASE===     #. Leukopenia  Fever at home per ED note, will need to confirm with patient. Will start abx given possible variceal bleed.  - Zosyn, Vanc, Acyclovir  - Follow UCx and Bcx     ===HEMATOLOGY===  #. Normocytic anemia  GI bleed. ED gave him 1U pRBCs for Hgb 8. Unable to add smear to prior labs.  - Trend, see above GI, Hgb>7     #. Coagulopathy  #. Thrombocytopenia  - Vitamin K IV x3days ordered  - Trend fibrinogen, INR     ===ENDOCRINE===  #. Hypoglycemia  In the setting of acute liver injury. Switched from D10 to D20 for worsening hypoglycemia.   - Now on D20      ===SKIN CARE===  No active issues      GI PPx: PPI IV BID  DVT PPx: coagulopathic; SCDs  CODE: FULL code (confirmed)  FAMILY: Updated sister today     Patient seen and discussed " "with staff attending, Dr. Greer.       Malick Coronado, PGY-1  #[id number]    -----------------------------------------------------------------------    INTERVAL HISTORY:  Since admission, overnight resident ordered many labs (see all labs ordered in \"changes today\" section).      PHYSICAL EXAMINATION (this morning prior to intubation):  Temp:  [97.3  F (36.3  C)-98.2  F (36.8  C)] 97.3  F (36.3  C)  Pulse:  [71-91] 71  Heart Rate:  [] 106  Resp:  [18-34] 24  BP: (102-125)/(68-88) 119/78  SpO2:  [95 %-100 %] 95 %  GEN: Awake, tachypneic  NEURO: Alert, no focal deficits  HEENT: NCAT, scleral icterus, oral pharynx with dried blood  LUNG: CTAB, no wheezes  CV:  RRR, no murmurs  ABD: +BS, distended, diffusely tender, semi-firm  EXT: wwp, no edema   SKIN: Jaundiced skin    LABS: Reviewed.   Arterial Blood Gases   Recent Labs   Lab 07/20/20  0455   PH 7.40   PCO2 14*   PO2 83   HCO3 9*     Complete Blood Count   Recent Labs   Lab 07/20/20  0345 07/20/20  0050 07/19/20  2233   WBC 2.1*  --  2.5*   HGB 7.7* 7.5* 8.2*   PLT 57*  --  67*     Basic Metabolic Panel  Recent Labs   Lab 07/20/20  0345 07/20/20  0202 07/20/20  0050 07/19/20  2233   * 132* 127* 128*   POTASSIUM 5.2 5.5* 5.3 5.4*   CHLORIDE 105 106  --  106   CO2 10* 10*  --  7*   BUN 99* 100*  --  92*   CR 6.42* 7.01*  --  6.80*   GLC 75 72 134* 65*     Liver Function Tests  Recent Labs   Lab 07/20/20  0645 07/20/20 0345 07/19/20 2233   AST  --  6,002* 6,396*   ALT  --  1,860* 1,969*   ALKPHOS  --  608* 653*   BILITOTAL 17.3* 18.0* 19.7*   ALBUMIN  --  1.4* 1.4*   INR  --  5.35* 8.05*     Coagulation Profile  Recent Labs   Lab 07/20/20 0345 07/19/20 2233   INR 5.35* 8.05*   PTT  --  212*       IMAGING:  Recent Results (from the past 24 hour(s))   XR Chest Port 1 View    Narrative    EXAM: XR CHEST PORT 1 VW  LOCATION: [facility]  DATE/TIME: 7/19/2020 11:44 PM    INDICATION: ; shortness of breath;  COMPARISON: None.      Impression    " IMPRESSION: Mild atelectasis at the left base medially. No evidence for CHF or pneumonia. No pleural effusion or pneumothorax.  Normal heart size.   US Abdomen Limited    Narrative    EXAMINATION: US ABDOMEN LIMITED, 7/20/2020 2:15 AM     COMPARISON: None available    HISTORY: Hepatitis, GI bleed.    TECHNIQUE: The abdomen was scanned in standard fashion with  specialized ultrasound transducer(s) using both grey scale and limited  color Doppler techniques.    Findings:  Liver: The liver demonstrates normal homogeneous echotexture. The  liver measures 15.4 cm.. No evidence of a focal hepatic mass or  intrahepatic biliary ductal dilatation. The main portal vein is patent  with antegrade flow.    Gallbladder: The gallbladder is well distended and of normal  morphology. Small amount of layering biliary sludge. There is no wall  thickening, pericholecystic fluid. Negative sonographic Nelson's sign.    Bile Ducts: Both the intra- and extrahepatic biliary system are of  normal caliber.  The common bile duct measures 6.8 mm in diameter.    Pancreas: Visualized portions of the head and body of the pancreas are  unremarkable.     Right kidney: Demonstrates normal echotexture without focal mass. No  hydronephrosis or nephrolithiasis. The craniocaudal dimensions 10.7  cm.    Lung bases: Right pleural effusion.      Impression    Impression:   1. Small amount of gallbladder sludge. No sonographic evidence of  acute cholecystitis.  2. Right pleural effusion.    I have personally reviewed the examination and initial interpretation  and I agree with the findings.    DYLAN NEWSOME MD

## 2020-07-20 NOTE — H&P
"    Medical ICU  History & Physical    Ivanna Espinoza MRN: [id number]  Age: 50 year old, : 1970  Date of Admission:2020  Primary care provider: No Ref-Primary, Physician            ASSESSMENT & PLAN     Ivanna Espinoza is a 50 year old hmong-speaking male with no past medical history with recent admission at [place], [place] with ?liver failure and presented to [facility] ED with SOB, hematochezia, hematemesis found to have acute liver injury and acute renal failure.     LINES  - PIVx2    ===NEURO===  Sedation/Analgesia: None    No acute issues  Consider head CT once medically stable    ===CARDIOVASCULAR===  #. Cardiomegaly   Noted on admission CXR.  - Echo ordered     ===RESPIRATORY===  #. Respiratory insufficiency  Tachypneic: Metabolic acidosis with respiratory alkalosis compensation. CXR on admission unremarkable. COVID-10 negative on  (low suspicion).   - continue BiPAP    ===GASTROINTESTINAL===  #. Acute liver injury  MELD 48.  Transaminitis, unclear baseline. Recent admission for \"liver problem\". RUQ US without any choleocystitis.   - Need more history from patient off BiPAP to determine etiology and chronicity  - Hepatology consult  - Labs: HAV/HBV/HCV, HIV, EBV, CMV, F-actin, Ceruloplasmin, JORGITO, ant-trypsin, iron panel including ferritin, UDS, Serum blood screen, tylenol/salicylate, alcohol level, ammonia, lactate, and consider other toxins once history obtained  - Imaging: Abd US w/ dopplers  - Treatment bernard: NAC for possible tylenol overdose, Octreotide gtt, PPI IV BID, Zosyn, Albumin challenge x3days, Vitamin K x3days    #. Nutrition  Strict NPO    #. GI bleed  Noted clots via rectum. No prior records.  Need more history from patient off BiPAP to determine etiology and chronicity  - PPI IV BID  - Octreotide gtt  - large bore PIV and consider a central line, PICC line    ===RENAL / ELECTROLYTES===  #. AZAM  Unclear baseline or etiology.   - Nephrology consulted  - Fena, UA, " Abd US completed ordered as above   - Avoid nephrotoxins including NSAIDs  - Albumin challenge given possible HRS   - Phan placed for strict I/Os    #. HAGMA  Likely 2/2 AZAM, cannot r/o other toxins. Lactate normal.   - Bicarb gtt per nephrology  - Follow VBGs or ABGs preferably     #. Hyponatremia  Appears euvolemic vs hypovolemic on exam. Unclear chronicity  - Uosm, Usodium and Sosm ordered  - Trend and avoid over-correction    #. Hypocalcemia  - Replete with ca gluconate and consider CaCl once central line available  UA order    ===INFECTIOUS DISEASE===     #. Leukopenia  Fever at home per ED note, will need to confirm with patient. Will start abx given possible variceal bleed.  - Zosyn  - Follow UCx and Bcx  - Tren    ===HEMATOLOGY===  #. Normocytic anemia  GI bleed. ED gave him 1U pRBCs for Hgb 8. Unable to add smear to prior labs.  - Trend, see above GI, Hgb>7    #. Coagulopathy  #. Thrombocytopenia  - Vitamin K IV x3days ordered  - Trend    ===ENDOCRINE===  #. Hypoglycemia  In the setting of acute liver injury &   - D10 gtt,     TSH w/ reflex    ===SKIN CARE===  No active issues    GI PPx: PPI IV BID  DVT PPx: hold off given active GI bleed   CODE: FULL code (presumed)  FAMILY: Talked to cousin, MAXIMINO Carbajal to obtain a story    Patient seen and discussed with staff attending, Dr. Mir.      Angelica Byrd, PGY-3  Internal Medicine  P: [phone]            MICU HPI  Ivanna Espinoza ([id number]) admitted on 7/19/2020  Primary care provider: No Ref-Primary, Physician           Reason for Transfer to MICU:     Acute liver injury and acute renal failure         History of Present Illness     Ivanna Espinoza is a 50 year old hmong-speaking male with no past medical history with recent admission at [place], [place] with ?liver failure and presented to [facility] ED with SOB, hematochezia, hematemesis found to have acute liver injury and acute renal failure.       Unable to obtain history via ipad  " since patient on BiPAP.    \"From ED note:  Patient states that he was recently hospitalized at [facility] in [place], [place] for two weeks due to similar symptoms. He reports that he was discharged 2-3 days ago. Patient states that while in hospital, he was told he has a liver problem, but he does not know the cause of the liver prblem. Today, patient states that he has been experiencing dark, bloody, loose stool 4-5 times today. He reports difficulty breathing. He states he feels nauseous, and has had a few episodes of hematemesis with bright red blood. Patient reports he has bryn having a fever of 100.4 F \"once a day\". Patient reports chest pain for the past 3-4 days. Patient states he has a rash on his back for 4-5 days. Patient states he has a tube in his nose (nasal tampon)  for 8-9 days as his nose was bleeding. Patient denies having heart problems or DM. Patient denies coughing.  Patient had been living in [facility] until about 1 year ago when he moved to [facility] to live with his sister.  He states he has been feeling well before going to [place] just over 2 weeks ago.  Patient denies history of kidney disease or other past medical problems.  He denies any excessive Tylenol use other than taking 1 to 2 tablets/day occasionally for fever.  Denies regular alcohol use.  He denies recent medication overdose or other possible toxicity that might of caused liver disease. \"             Past Medical History     No past medical history on file.      Past Surgical History     No past surgical history on file.       Medications     No current outpatient medications on file.          Allergies     No Known Allergies       Social History     Social History     Socioeconomic History     Marital status:      Spouse name: Not on file     Number of children: Not on file     Years of education: Not on file     Highest education level: Not on file   Occupational History     Not on file   Social " Needs     Financial resource strain: Not on file     Food insecurity     Worry: Not on file     Inability: Not on file     Transportation needs     Medical: Not on file     Non-medical: Not on file   Tobacco Use     Smoking status: Not on file   Substance and Sexual Activity     Alcohol use: Not on file     Drug use: Not on file     Sexual activity: Not on file   Lifestyle     Physical activity     Days per week: Not on file     Minutes per session: Not on file     Stress: Not on file   Relationships     Social connections     Talks on phone: Not on file     Gets together: Not on file     Attends Bahai service: Not on file     Active member of club or organization: Not on file     Attends meetings of clubs or organizations: Not on file     Relationship status: Not on file     Intimate partner violence     Fear of current or ex partner: Not on file     Emotionally abused: Not on file     Physically abused: Not on file     Forced sexual activity: Not on file   Other Topics Concern     Not on file   Social History Narrative     Not on file          Family History     No family history on file.       Review of Systems     10 Point ROS negative unless mentioned in HPI    Per HPI          OBJECTIVE     Temp:  [98.2  F (36.8  C)] 98.2  F (36.8  C)  Pulse:  [76-84] 82  Heart Rate:  [78-86] 86  Resp:  [28-32] 29  BP: (107-121)/(70-88) 121/83  SpO2:  [98 %-100 %] 98 %    Resp: 29      Physical Exam  GEN: Awake, tachypneic    NEURO: Alert, no focal deficits  HEENT: NCAT, scleral icterus, oral pharynx with dried blood  LUNG: CTAB, no wheezes  CV:  RRR, no murmurs  ABD: +BS, distended, diffusely tender, semi-firm  EXT: wwp, no edema  SKIN: Jaundiced skin    Peripheral IV 07/19/20 Left Upper arm (Active)   Number of days: 1       Peripheral IV 07/19/20 Right Upper arm (Active)   Number of days: 1     No intake/output data recorded.    There were no vitals filed for this visit.    ABG / VBG:   Recent Labs   Lab Test  07/20/20 0050   PHV 7.22*   PCO2V 16*       BMP:   Recent Labs   Lab Test 07/20/20  0106 07/20/20 0050 07/19/20 2233   NA  --  127* 128*   POTASSIUM  --  5.3 5.4*   CHLORIDE  --   --  106   CO2  --   --  7*   ANIONGAP  --   --  15*   LACT 1.6  --   --    BUN  --   --  92*   CR  --   --  6.80*   GLC  --  134* 65*   TAI  --   --  5.1*       Hepatic Studies:   Recent Labs   Lab Test 07/19/20 2233   AST 6,396*   ALT 1,969*   ALKPHOS 653*   BILITOTAL 19.7*   ALBUMIN 1.4*       Heme Studies:   Recent Labs   Lab Test 07/20/20 0050 07/19/20 2233   WBC  --  2.5*   ANEU  --  1.6   ALYM  --  0.7*   AEOS  --  0.1   HGB 7.5* 8.2*   MCV  --  81   PLT  --  67*   INR  --  8.05*       Urine Studies: No lab results found.    Invalid input(s): BLD    Imaging: reviewed in Epic, pertinent findings mentioned below           [Bedroom] :  in bedroom [None] : none

## 2020-07-20 NOTE — PROGRESS NOTES
Attempted to see pt for bedside PFT, but pt currently needing continuous BiPAP support. Will try again at a late time.     Sonia Hector, RT

## 2020-07-20 NOTE — CONSULTS
HEPATOLOGY CONSULTATION      Date of Admission:  7/19/2020          ASSESSMENT AND RECOMMENDATIONS:     50 year old male with no significant medical history; admitted with findings of severe acute liver injury, and acute kidney injury.       #. Acute liver injury  Unclear etiology but considering ischemic (HVOT obstruction vs hypoperfusion) vs toxin vs viral etiology vs autoimmune etiology. Given recent visit to [place], will need to rule out fungal etiology as well. He has evidence of synthetic dysfunction but no DIC (fibrinogen >1200). His course is now complicated by acute kidney injury and impending respiratory failure.   Of note, he has a diffuse rash, which could be due to platelet dysfunction/thrombocytopenia, but also considering cryoglobulinemia or rickettsial/tick borne illness.   At this point, he does not meet criteria for shelter, as he is not encephalopathic; however he is at high risk of progression.   MELD-Na of  48    Hepatic encephalopathy: None  Ascites: None   Infection: No clear evidence of infection at this point  Kidney function: AZAM, likely pre-renal vs ATN. Not typical of HRS based on UA.     RECOMMENDATIONS:  -- Continue NAC IV  -- Start IV ACV  - can discontinue of HSV PCR is negative  -- Please give IV albumin 1g/kg daily for 48 hours  -- Assess for possible etiology (if not already collected)   -- Obtain HAV IgM   -- HBsAg, HBsAb, HBcAb IgM (HBV DNA if warranted)   -- HCV Ab (HCV RNA and genotype if positive)   -- CMV PCR, EBV PCR, HSV PCR   -- JORGITO, Anti-SM Ab (F-Actin), IgG   -- APAP, salicylate levels   -- Coccidiodes Ab/Ag, Histoplasma Ag/Ab,    -- Rickettsial Ab  -- Check Cryoglobulins  -- Abdominal doppler ultrasound  -- Echocardiogram  -- Monitor neurologic status, high risk of progression  -- Trend the following labs every 6 hours to every 8 hours hours              - Complete Metabolic Panel, Magnesium, Phosphorus              - CBC                - PT/INR   -- Broad spectrum  "antibiotics  -- Infection surveillance every 48 hours    - Blood cultures   - Urine cultures   - Chest XR   - Diagnostic paracentesis  -- Ensure sodium restriction to 2000 mg per day  -- PUD prophylaxis with IV PPI 40 mg daily  -- IV Vitamin K 10 mg daily for 3 days   -- Liver transplant evaluation labs ordered for you, Transplant SW and Surgery consulted  -- Nephrology consult        Thank you for involving us in this patient's care. Please do not hesitate to contact the GI service with any questions or concerns.     Patient care plan discussed with Dr. Leventhal, GI staff physician.    Vaughn Caceres MD  Transplant Hepatology Fellow  PGY 6 ([phone])            Chief Complaint:   We were asked by Dr. Byrd of [facility] to evaluate this patient with ALI    History is obtained from the patient and the medical record.          History of Present Illness:   Ivanna Espinoza is a 50 year old male with no significant medical history; admitted with findings of severe acute liver injury, and acute kidney injury.      He is visiting from [place] and was in [place] to visit a cousin. He developed abdominal pain, rash, dark stools and bloody vomitus. He was admitted to a hospital in [place] and was told that he has a \"liver problem\". He notes that he had an endoscopy but does not remember details of this procedure.  He was discharged from the hospital and came back to [place] yesterday. He was brought in by his cousin. He admits to abdominal pain, nausea and feels weak and tired. He denies any personal or family history of HBV. He has no known medical illness. He was taken tylenol when he had fevers but denies any excessive us of Tylenol or NSAIDs.            Past Medical History:   None         Past Surgical History:   Reviewed and edited as appropriate   Past Surgical History:   Procedure Laterality Date     MIDLINE DOUBLE LUMEN PLACEMENT Right 07/20/2020    [place], [place]          Previous Endoscopy:   No results found for this " "or any previous visit.         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  Barely drinks any alcohol  Smokes 7 cigarettes per day         Family History:   Reviewed and edited as appropriate  No known history of gastrointestinal/liver disease or  gastrointestinal malignancies       Allergies:   Reviewed and edited as appropriate   No Known Allergies         Medications:     Current Facility-Administered Medications   Medication     acetylcysteine (ACETADOTE) 2.52 g in sodium chloride 0.9 % 500 mL STEP TWO infusion     acetylcysteine (ACETADOTE) 6,000 mg in sodium chloride 0.9 % 500 mL STEP THREE infusion     albumin human 25 % injection 50 g     dextrose 10% infusion     heparin lock flush 10 UNIT/ML injection 2-5 mL     lidocaine (LMX4) cream     lidocaine 1 % 0.1-5 mL     naloxone (NARCAN) injection 0.1-0.4 mg     octreotide (sandoSTATIN) 1,250 mcg in sodium chloride 0.9 % 250 mL     oxymetazoline (AFRIN) 0.05 % spray 2 spray     oxymetazoline (AFRIN) 0.05 % spray 5 spray     pantoprazole (PROTONIX) 40 mg IV push injection     [START ON 7/21/2020] phytonadione (AQUA-MEPHYTON) 10 mg in sodium chloride 0.9 % 50 mL intermittent infusion     piperacillin-tazobactam (ZOSYN) 2.25 g vial to attach to  ml bag     sodium bicarbonate 150 mEq in water for infusion     sodium chloride (PF) 0.9% PF flush 5-50 mL           Review of Systems:     A complete review of systems was performed and is negative except as noted in the HPI           Physical Exam:   BP 95/69   Pulse 71   Temp 97.6  F (36.4  C)   Resp 30   Ht 1.52 m (4' 11.84\")   Wt 50.4 kg (111 lb 1.8 oz)   SpO2 95%   BMI 21.81 kg/m    Wt:   Wt Readings from Last 2 Encounters:   07/20/20 50.4 kg (111 lb 1.8 oz)      Constitutional: cooperative, seems in some distress, warm to touch, jaundiced  Eyes: " Sclera icteric  Ears/nose/mouth/throat: NG tube in situ  Neck: supple  CV: Mild bilateral pedal edema  Respiratory: Unlabored breathing  Lymph: NAD  Abdomen: Firm,nondistended, +bs, no hepatosplenomegaly, nontender, no peritoneal signs  Skin: diffuse confluent petechial rash over abdomen, forearms, thighs and groin. Jaundice present  Neuro: AAO x 3, No asterixis, no focal deficits  Psych: Anxious  MSK: In bed         Data:   Labs and imaging below were independently reviewed and interpreted    MELD-Na score: 48 at 7/20/2020  9:52 AM  MELD score: 49 at 7/20/2020  9:52 AM  Calculated from:  Serum Creatinine: 6.05 mg/dL (Rounded to 4 mg/dL) at 7/20/2020  9:52 AM  Serum Sodium: 133 mmol/L at 7/20/2020  9:52 AM  Total Bilirubin: 17.6 mg/dL at 7/20/2020  9:52 AM  INR(ratio): 5.35 at 7/20/2020  3:45 AM  Age: 50 years 2 months     BMP  Recent Labs   Lab 07/20/20  0952 07/20/20  0345 07/20/20  0202 07/20/20  0050 07/19/20  2233    130* 132* 127* 128*   POTASSIUM 4.6 5.2 5.5* 5.3 5.4*   CHLORIDE 102 105 106  --  106   TAI <5.0* 5.2* 5.1*  --  5.1*   CO2 12* 10* 10*  --  7*   BUN 97* 99* 100*  --  92*   CR 6.05* 6.42* 7.01*  --  6.80*   GLC 67* 75 72 134* 65*     CBC  Recent Labs   Lab 07/20/20  0952 07/20/20  0345  07/19/20  2233   WBC 1.7* 2.1*  --  2.5*   RBC 2.60* 2.82*  --  3.05*   HGB 7.0* 7.7*   < > 8.2*   HCT 20.9* 22.7*  --  24.8*   MCV 80 81  --  81   MCH 26.9 27.3  --  26.9   MCHC 33.5 33.9  --  33.1   RDW 16.4* 16.7*  --  16.9*   PLT 16* 57*  --  67*    < > = values in this interval not displayed.     INR  Recent Labs   Lab 07/20/20  0345 07/19/20 2233   INR 5.35* 8.05*     LFTs  Recent Labs   Lab 07/20/20  0952 07/20/20  0645 07/20/20 0345 07/19/20 2233   ALKPHOS 536*  --  608* 653*   AST 5,392*  --  6,002* 6,396*   ALT 1,794*  --  1,860* 1,969*   BILITOTAL 17.6* 17.3* 18.0* 19.7*   PROTTOTAL 3.5*  --  3.7* 3.9*   ALBUMIN 1.2*  --  1.4* 1.4*      PANC  Recent Labs   Lab 07/20/20 0345   LIPASE 282        Imaging:  US ABDOMEN LIMITED, 7/20/2020 2:15 AM    Findings:  Liver: The liver demonstrates normal homogeneous echotexture. The  liver measures 15.4 cm.. No evidence of a focal hepatic mass or  intrahepatic biliary ductal dilatation. The main portal vein is patent  with antegrade flow.   Gallbladder: The gallbladder is well distended and of normal  morphology. Small amount of layering biliary sludge. There is no wall  thickening, pericholecystic fluid. Negative sonographic Nelson's sign.   Bile Ducts: Both the intra- and extrahepatic biliary system are of  normal caliber.  The common bile duct measures 6.8 mm in diameter.   Pancreas: Visualized portions of the head and body of the pancreas are  unremarkable.    Right kidney: Demonstrates normal echotexture without focal mass. No  hydronephrosis or nephrolithiasis. The craniocaudal dimensions 10.7  cm.   Lung bases: Right pleural effusion.                                                                   Impression:   1. Small amount of gallbladder sludge. No sonographic evidence of  acute cholecystitis.  2. Right pleural effusion.

## 2020-07-20 NOTE — PROCEDURES
[facility], [place]    Double Lumen Midline Placement    Date/Time: 7/20/2020 11:18 AM  Performed by: Marianela Burroughs RN  Authorized by: Ghada Mir MD   Indications: Access.    UNIVERSAL PROTOCOL   Site Marked: Yes  Prior Images Obtained and Reviewed:  Yes  Required items: Required blood products, implants, devices and special equipment available    Patient identity confirmed:  Verbally with patient  Patient was reevaluated immediately before administering moderate or deep sedation or anesthesia  Confirmation Checklist:  Patient's identity using two indicators, relevant allergies, procedure was appropriate and matched the consent or emergent situation and correct equipment/implants were available  Time out: Immediately prior to the procedure a time out was called    Universal Protocol: the Joint Commission Universal Protocol was followed    Preparation: Patient was prepped and draped in usual sterile fashion           ANESTHESIA    Anesthesia: See MAR for details  Local Anesthetic:  Lidocaine 1% without epinephrine  Anesthetic Total (mL):  5      SEDATION    Patient Sedated: No        Preparation: skin prepped with ChloraPrep  Skin prep agent: skin prep agent completely dried prior to procedure  Sterile barriers: maximum sterile barriers were used: cap, mask, sterile gown, sterile gloves, and large sterile sheet  Hand hygiene: hand hygiene performed prior to central venous catheter insertion  Type of line used: Midline  Catheter type: double lumen  Lumen type: non-valved  Catheter size: 5 Fr  : BIOFLO   Lot number: [id number]  Placement method: venipuncture, MST and ultrasound  Number of attempts: 1  Successful placement: yes  Orientation: right  Location: brachial vein (medial) (vein diameter- 0.58cm)  Site rationale: left hemaparesis  Arm circumference: adults 10 cm  Extremity circumference: 29  Visible catheter length: 1  Internal length: 19 cm  Total catheter length:  20  Dressing and securement: chlorhexidine patch applied, statlock, blood cleaned with CHG, site cleaned and sterile dressing applied  Post procedure assessment: blood return through all ports  PROCEDURE   Patient Tolerance:  Patient tolerated the procedure well with no immediate complications  Describe Procedure: Midline okay to use.

## 2020-07-20 NOTE — CONSULTS
"  Hematology  Consult Note   Date of Service: 07/20/2020    Patient: Ivanna Espinoza  MRN: [id number]  Admission Date: 7/19/2020  Hospital Day # 0    Reason for Consult: Pancytopenia      History of Present Illness:    Ivanna Espinoza is a 50 year old male with no known past medical history who was admitted through the ED on 7/20 for hematochezia, hematemesis and epistaxis. Reportedly, patient was admitted with similar symptoms at [facility] in [place], [place] for 2 weeks and was discharged 2-3 days ago.     Admission labs showed hyponatremia (Th=188), anion gap metabolic acidosis(anion gap=15), renal failure (Cr=6.80), liver failure (ALT=1969, HTZ=4428, total bilirubin=19.7, alkaline czknxkdnmat=677), pancytopenia (WBC=2.5, ANC=1.6, Hgb=8.2 and platelet=67), coagulopathy (INR=8.05, PTT increased to 212 and undetectable fibrinogen) and extreme hyperferritinemia (hjetlhvz=10726).     He was admitted to MICU. He was ruled out for COVID-19. He was intubated today and is on vasopressor support with Levophed. He is on piperacillin/tazobactam. He is on acetylcysteine, octreotide and bicarbonate drips. CT head and CAP is pending. Hepatology and renal services are consulting. CRRT has been initiated. ENT was also consulted because of significant oropharyngeal bleeding at the time of intubation. OSH records are being obtained.     Hematology service was consulted today for pancytopenia, coagulopathy and concern for HLH. Since admission, he has received vitamin K 10 mg IV x 2.     Past Medical History:  Not able to be obtained at this time    Past Surgical History:  Not able to be obtained at this time    Social History:  Not able to be obtained at this time     Family History:  Not able to be obtained at this time       Physical Exam:    Blood pressure 95/69, pulse 71, temperature 97.6  F (36.4  C), resp. rate 30, height 1.52 m (4' 11.84\"), weight 50.4 kg (111 lb 1.8 oz), SpO2 94 %.  General: Sedated and intubated "   CV: RRR, no murmurs  Resp: CTAB, normal respiratory effort on ambient air  GI: Soft, no splenomegaly on exam, bowel sounds present and normoactive  MSK: Warm and well-perfused, no edema  Skin: Generalized petechiae; ecchymoses on the abdomen    Labs & Studies: I personally reviewed the following studies:  ROUTINE LABS (Last four results):    CMP  Recent Labs   Lab 07/20/20  0952 07/20/20  0645 07/20/20  0345 07/20/20  0202 07/20/20 0050 07/19/20  2233     --  130* 132* 127* 128*   POTASSIUM 4.6  --  5.2 5.5* 5.3 5.4*   CHLORIDE 102  --  105 106  --  106   CO2 12*  --  10* 10*  --  7*   ANIONGAP 19*  --  14 16*  --  15*   GLC 67*  --  75 72 134* 65*   BUN 97*  --  99* 100*  --  92*   CR 6.05*  --  6.42* 7.01*  --  6.80*   GFRESTIMATED 10*  --  9* 8*  --  9*   GFRESTBLACK 12*  --  11* 10*  --  10*   TAI <5.0*  --  5.2* 5.1*  --  5.1*   MAG  --   --  2.8*  --   --   --    PHOS 9.3*  --   --   --   --   --    PROTTOTAL 3.5*  --  3.7*  --   --  3.9*   ALBUMIN 1.2*  --  1.4*  --   --  1.4*   BILITOTAL 17.6* 17.3* 18.0*  --   --  19.7*   ALKPHOS 536*  --  608*  --   --  653*   AST 5,392*  --  6,002*  --   --  6,396*   ALT 1,794*  --  1,860*  --   --  1,969*     CBC  Recent Labs   Lab 07/20/20  0952 07/20/20  0345 07/20/20 0050 07/19/20  2233   WBC 1.7* 2.1*  --  2.5*   RBC 2.60* 2.82*  --  3.05*   HGB 7.0* 7.7* 7.5* 8.2*   HCT 20.9* 22.7*  --  24.8*   MCV 80 81  --  81   MCH 26.9 27.3  --  26.9   MCHC 33.5 33.9  --  33.1   RDW 16.4* 16.7*  --  16.9*   PLT 16* 57*  --  67*     INR  Recent Labs   Lab 07/20/20  0345 07/19/20  2233   INR 5.35* 8.05*       Peripheral smear: Peripheral smear was personally reviewed. It demonstrated anisocytosis, poikilocytosis, spherocytes, nucleated RBC's, L-shift with myelocytes and metamyelocytes but only rare schistocytes.     Assessment & Recommendations:   #Pancytopenia  It is most likely related to acute liver failure, acute critical illness and possible underlying systemic  infection. Reticulocyte count inappropriately low normal at 38.3 which goes in favor of a hypo proliferative cause for anemia/pancytopenia rather than hemolysis. Peripheral smear on our personal review showed only rare schistocytes as above which is not consistent with TTP. No splenomegaly on exam. HIV, HBV, HCV and HSV are in process. Labs not consistent with hypothyroidism (TSH low at 0.1 and fT4 elevated at 1.76).   - Continue supportive cares per the primary team.  - Transfuse to keep Hgb>7.0 and platelets>20k.   - Agree with checking EBV/CMV.  - Await LDH.   - Agree with CT CAP.   - Await OSH records which will be very helpful to provide more insight into patient's case.   - Consider ID consult depending upon the work-up and clinical course.     #Coagulopathy  #Elevated INR  #Undetectable fibrinogen  Elevated INR and PTT and undetectable fibrinogen is related to impaired liver synthetic function and acute critical illness. No evidence of high-grade DIC.   - Monitor coags at least once daily to Q8H.  - Keep fibrinogen>100 and INR<2.0 as possible.    #Extreme hyperferritinemia  Ferritin on [id number]. Ferritin is stored in hepatocytes and can spill into the circulation with hepatocyte destruction or acute liver failure as in this case. HLH is considered less likely.  - Await EBV.  - Triglycerides and soluble IL-2RA levels are in process with the caveat that IL-2RA levels can go up with liver failure.       We will continue to follow this patient while hospitalized. Please do not hesitate to page with any questions or concerns. Patient was seen and plan of care was discussed with attending physician Dr. Noriega.    Guicho Munroe  Hematology/Oncology Fellow  Pager: [phone]

## 2020-07-20 NOTE — ED TRIAGE NOTES
Pt presents to triage with son. Son states he was in [place] and started to have shortness of breath and fevers. Pt has history of liver problems. Hmong speaking. Son states pt was having abdominal pain but through  it was discovered that the main problem is shortness of breath. Was recently hospitalized in [place] for 2 weeks.

## 2020-07-20 NOTE — ED NOTES
[facility], [place]   ED Nurse to Floor Handoff     Ivanna Espinoza is a 50 year old male who speaks Hmong and lives with family members,  in a home  They arrived in the ED by car from home    ED Chief Complaint: Shortness of Breath    ED Dx;   Final diagnoses:   Gastrointestinal hemorrhage with melena   Gastrointestinal hemorrhage with hematemesis   Acute renal failure, unspecified acute renal failure type (H)   Hepatitis         Needed?: Yes    Allergies: No Known Allergies.  Past Medical Hx: No past medical history on file.   Baseline Mental status: WDL  Current Mental Status changes: at basesline    Infection present or suspected this encounter: no  Sepsis suspected: No  Isolation type: Enteric     Activity level - Baseline/Home:  Stand with Assist  Activity Level - Current:   Stand with assist x2    Bariatric equipment needed?: No    In the ED these meds were given:   Medications   sodium bicarbonate 150 mEq in water for infusion ( Intravenous New Bag 7/20/20 0118)   dextrose 50 % injection 50 mL (50 mLs Intravenous Given 7/19/20 2347)   phytonadione (AQUA-MEPHYTON) 10 mg in sodium chloride 0.9 % 50 mL intermittent infusion (0 mg Intravenous Stopped 7/20/20 0048)   sodium bicarbonate 8.4 % injection 50 mEq (50 mEq Intravenous Given 7/20/20 0124)       Drips running?  Yes    Home pump  No    Current LDAs  Peripheral IV 07/19/20 Left Upper arm (Active)   Number of days: 1       Peripheral IV 07/19/20 Right Upper arm (Active)   Number of days: 1       Labs results:   Labs Ordered and Resulted from Time of ED Arrival Up to the Time of Departure from the ED   CBC WITH PLATELETS DIFFERENTIAL - Abnormal; Notable for the following components:       Result Value    WBC 2.5 (*)     RBC Count 3.05 (*)     Hemoglobin 8.2 (*)     Hematocrit 24.8 (*)     RDW 16.9 (*)     Platelet Count 67 (*)     Nucleated RBCs 23 (*)     Absolute Lymphocytes 0.7 (*)     All other components within  normal limits   COMPREHENSIVE METABOLIC PANEL - Abnormal; Notable for the following components:    Sodium 128 (*)     Potassium 5.4 (*)     Carbon Dioxide 7 (*)     Anion Gap 15 (*)     Glucose 65 (*)     Urea Nitrogen 92 (*)     Creatinine 6.80 (*)     GFR Estimate 9 (*)     GFR Estimate If Black 10 (*)     Calcium 5.1 (*)     Bilirubin Total 19.7 (*)     Albumin 1.4 (*)     Protein Total 3.9 (*)     Alkaline Phosphatase 653 (*)     ALT 1,969 (*)     AST 6,396 (*)     All other components within normal limits   INR - Abnormal; Notable for the following components:    INR 8.05 (*)     All other components within normal limits   PARTIAL THROMBOPLASTIN TIME - Abnormal; Notable for the following components:     (*)     All other components within normal limits   CK TOTAL - Abnormal; Notable for the following components:    CK Total 716 (*)     All other components within normal limits   ISTAT GASES ELEC ICA GLUC GARRET POCT - Abnormal; Notable for the following components:    Ph Venous 7.22 (*)     PCO2 Venous 16 (*)     Bicarbonate Venous 7 (*)     Sodium 127 (*)     Glucose 134 (*)     Calcium Ionized 2.9 (*)     Hemoglobin 7.5 (*)     Hematocrit - POCT 22 (*)     All other components within normal limits   COVID-19 VIRUS (CORONAVIRUS) BY PCR   TROPONIN I   LACTIC ACID WHOLE BLOOD   ACETAMINOPHEN LEVEL   SALICYLATE LEVEL   ISTAT GAS OR ELECTROLYTE NURSING POCT   ABO/RH TYPE AND SCREEN   BLOOD CULTURE   CLOSTRIDIUM DIFFICILE TOXIN B   ENTERIC BACTERIA AND VIRUS PANEL BY DEVON STOOL       Imaging Studies:   Recent Results (from the past 24 hour(s))   XR Chest Port 1 View    Narrative    EXAM: XR CHEST PORT 1 VW  LOCATION: [facility]  DATE/TIME: 7/19/2020 11:44 PM    INDICATION: ; shortness of breath;  COMPARISON: None.      Impression    IMPRESSION: Mild atelectasis at the left base medially. No evidence for CHF or pneumonia. No pleural effusion or pneumothorax.  Normal heart size.       Recent vital  signs:   /83   Pulse 82   Temp 98.2  F (36.8  C) (Axillary)   Resp 29   SpO2 98%             Cardiac Rhythm: A fib  Pt needs tele? Yes  Skin/wound Issues: petechial rash all over body    Code Status: unknown, new patient to [facility]    Pain control: fair    Nausea control: pt had none    Abnormal labs/tests/findings requiring intervention: see epic    Family present during ED course? Yes   Family Comments/Social Situation comments: brother in law/cousin with pt at first, speak a little english    Tasks needing completion: None    Ludmila Negron, RN  [phone] [facility] ED

## 2020-07-20 NOTE — PROGRESS NOTES
"CLINICAL NUTRITION SERVICES - ASSESSMENT NOTE     Nutrition Prescription    RECOMMENDATIONS FOR MDs/PROVIDERS TO ORDER:  Once pt is safe for TF initiation, would place either OG or small bore NGT and initiate TF (may need ENT to weigh in if pt continues to bleed). Recommend low phos formula, Nepro, @ 10 mL/hr and adv by 20 mL q8h until @ goal rate of 40 ml/hr to provide 1728 kcals (35 kcal/kg/day), 78 g PRO (1.6 g/kg/day), 701 mL H2O, 155 g CHO and 12 g Fiber daily.     If unable to enterally feed pt within the next 2-3 days would initiate TPN and lipids for nutrition therapy. Recommend 840 mL of 150 g dextrose (goal 250 g dextrose/day), 75 g PRO (1.5 g PRO/kg) and IV lipids given M-F. Dosing wt 50 kg. With goal dextrose, this regimen would provide 1507 kcal (30 kcal/kg) and a GIR of 3.47 mg/kg/min.     Malnutrition Status:    Unable to determine due to inability to physically assess pt    Recommendations already ordered by Registered Dietitian (RD):  Discussed nutrition POC on MICU rounds today, no plans for diet or TF d/t unknown procedures in the near future. Pt to remain NPO for now. Per MD, pt's mouth and throat were caked in dried blood when he went to intubate pt.       REASON FOR ASSESSMENT  Ivanna Espinoza is a 50 year old male assessed by the dietitian for Admission Nutrition Risk Screen for reduced oral intake over the last month (later received Liver Transplant Eval)    NUTRITION HISTORY  Pt was at OSH in [place] for ~2 wks, dx with new hepatitis. MICU team is still attempting to obtain records from OSH. Pt was just intubated. Oral space is going to be packed.    CURRENT NUTRITION ORDERS  Diet: NPO  Intake/Tolerance: MAYANK. Presume pt has not been eating well over at least the past week or two.    LABS  Labs reviewed- hyponatremia (130 today); creat >6  Phos is 9.3    MEDICATIONS  Medications reviewed- vitamin K replacement    ANTHROPOMETRICS  Height: 152 cm (4' 11.843\")  Most Recent Weight: 50.4 kg (111 lb 1.8 " Patient signed 72 hour notice 10/29/18 @ 5338 oz)   IBW: 48.2 kg  BMI: Normal BMI - meets criteria for liver transplant requirements  Weight History: MAYANK  Dosing Weight: 50 kg (adm wt)    ASSESSED NUTRITION NEEDS  Estimated Energy Needs: 5941-8444+ kcals/day (25 - 30+ kcals/kg)  Justification: Maintenance, likely will need dialysis  Estimated Protein Needs:  g/day (1.5 - 2 g/kg)  Justification: presumption of need for dialysis  Estimated Fluid Needs: Per provider pending fluid status    PHYSICAL FINDINGS  See malnutrition section below.  Nutrition focused physical exam available per MD request. --> Unable to obtain nutrition focused physical assessment (NFPA) as the number of staff going into rooms is restricted to limit exposure and to minimize use of PPE. Pt was having a procedure on both attempts to visualize him; from doorway during rounds this AM, pt appears small in stature.    Per chart:  Scleral icterus  Abd somewhat firm  Rash  Nares are packed with nasal tampons (have been in for awhile?)    MALNUTRITION  % Intake: Unable to assess, but assume decreased PO intake for approx 2 wks  % Weight Loss: Unable to assess  Subcutaneous Fat Loss: Unable to assess  Muscle Loss: Unable to assess  Fluid Accumulation/Edema: Mild- some LE edema per chart  Malnutrition Diagnosis: Unable to determine due to inability to physically assess pt    NUTRITION DIAGNOSIS  Inadequate protein-energy intake related to GI status inhibiting ability to take PO as evidenced by pt presumed to have had reduced PO intake for at least (or approximately) 2 weeks, currently NPO.    INTERVENTIONS  Implementation  Nutrition Education: Not appropriate at this time due to patient condition (just intubated). Pt is not approp for diet education.    Collaboration with other providers - discussed nutrition POC on MICU rounds.     Goals  Total avg nutrition intake to meet a minimum of 25 kcal/kg and 1.5 g PRO/kg daily (dosing wt 50 kg).     Monitoring/Evaluation  Progress toward goals will be  monitored and evaluated per protocol.    Vane Villatoro RD, LD  ([facility] dietitian, [phone]- [phone])

## 2020-07-20 NOTE — PROGRESS NOTES
[facility], [place]  Procedure Note           Intubation:       Ivanna Espinoza  MRN# [id number]   July 20, 2020, 12:40 PM Indication: Inability to protect airway           Patient intubated at: July 20, 2020, 12:40 PM   Patient informed of: Why intubation was required   Informed consent: Obtained   Cervical spine: Was stabilized during the procedure   Sedative medication: Was administered during the procedure   Technique used: Fiberoptic visualization with GlideScope   Endotracheal tube size: 7.5 cm with cuff   Number of attempts: 2   Placement confirmed by: Auscultation of bilateral breath sounds  Visualization of bilateral chest wall rise  End-tidal CO2 monitor   ET tube repositioning: Was performed   Tube secured at: 25 cm @ lip      This procedure was performed without difficulty and he tolerated the procedure well with no complications.      Vent settings per MD order as follows:    CMV  18, 400, 100%, +5    Labs & cxr to follow.    Recorded by Sonia Hector RT

## 2020-07-21 NOTE — PROGRESS NOTES
CRRT INITIATION NOTE    Consent for CRRT Completed:  YES  Patient s Vascular Access: Catheter              Placement Confirmed: YES  Manufacture:  DoughMain  Model:  Palindrome  Length/Montserratian Size:  14.5x23cm  Flush Volume:  1.5mL    DATA:  Procedure:  CVVHDF  Start Time:  0933  Machine#:  [id number]  Filter:  M150  Blood Flow:  200  ML/min  Pre-Replacement Solution:  PrismaSol BGK 2/3.5  Post-Replacement Solution:  Sodium Bicarb  Dialysate Solution:  PrismaSol BGK 2/3.5  Pre-Replacement Solution Rate:  600 mL/hr  Post-Replacement Solution Rate:  200 mL/hr  Dialysate Flow Rate:  600 mL/hr   Patient Removal Rate:  I=O  Anticoagulation Type and Rate:  NA    ASSESSMENT:  How Patient Tolerated Initiation:   Vital Signs:  B/P: 98/47, MAP 50-70 T: 96.5, -120  Initial Pressures:  Access:  -60  Filter:  88  Return:  46  TMP:  43  Change in Filter Pressure:  16      INTERVENTIONS:  CRRT initiated with a post bicarb solution, Pt remains on pressors    PLAN:  Continue fluid removal as tolerated per goals of therapy.  Check filter daily.  Change filter q 72 hrs and PRN.  Please contact the CRRT resource RN at [phone] with any questions/concerns.

## 2020-07-21 NOTE — COMMITTEE REVIEW
Abdominal Committee Review Note     Evaluation Date: 7/20/2020  Committee Review Date: 7/21/2020    Organ being evaluated for: Liver    Transplant Phase: Evaluation  Transplant Status: Active    Transplant Coordinator: Matt Jara Jr.  Transplant Surgeon:       Referring Physician:     Primary Diagnosis:   Secondary Diagnosis:     Committee Review Members:  Nurse Practitioner Adrienne Noriega, NP   Nutrition Varsha Gibbs, RD   Pharmacy Giorgio Sanches, [facility]    - Clinical Avis Orr, HAYLEY, Diana Bagley, [facility]   Transplant Chimaobi Hamzah Caceres MD, Nj Devlin MD, Bin Cannon MD, Jr Matt Jara, RN, Sharifa Sinclair MD, Ai Amaral, RN, Oleksandr Bryd MD, Flash Herman MD, Bhumi Cartagena, LINNEA, Zoe Reilly MD, Ministerio Barrow MD, Thomas M. Leventhal, MD, Won Shankar MD, Tammy Johnson MD       Transplant Eligibility: Cirrhosis with MELD, HBV    Committee Review Decision: Needs Re-presentation    Relative Contraindications: Other, too acutely ill at this time    Absolute Contraindications: None    Committee Chair Leventhal, Thomas Michael, MD verbally attested to the committee's decision.    Committee Discussion Details:     Defer discussion - most likely not survivable where he is at right now    IP team to continue to follow    July 22, 2020 12:34 PM -  [id number]:   Patient passed on 7/22/20

## 2020-07-21 NOTE — PROGRESS NOTES
"Otolaryngology Progress Note  July 21, 2020  24 hr events:   - started CRRT  - ongoing transfusions (thus far 3U pRBC, 2U plts, 3U FFP, 3U cryo)  - work-up for transfusion reaction (tachycardic, tachypneic), on vasopressors     S: No acute events overnight. Some oozing from left nare but no significant bleeding. No fresh bleeding from oral cavity but packing soaked.     O: BP 98/47   Pulse 107   Temp 99.5  F (37.5  C) (Axillary)   Resp 26   Ht 1.52 m (4' 11.84\")   Wt 57.4 kg (126 lb 8.7 oz)   SpO2 (!) 64%   BMI 24.84 kg/m     General: intubated and sedated   HEENT: dried blood crusted over right nare, rhino balloon packing in place and inflated, still easily compressible. Both dried and fresh blood filling left nare, no active bleeding when blood cleared away. Oropharyngeal packing in place, soaked in blood- dry. No fresh blood seen in oral cavity or draining outside mouth. Left packing in place- did not examine posterior oropharynx.    Pulmonary: ETT in place, mechanically ventilated.     LABS:  Recent Labs   Lab 07/21/20  0810 07/21/20  0400 07/20/20 2356 07/20/20 2120   WBC 0.9* 1.1* 1.3* 1.3*   RBC 2.27* 2.47* 2.18* 1.94*   HGB 6.3* 7.0* 6.1* 5.2*   HCT 19.1* 20.5* 18.1* 15.8*   MCV 84 83 83 81   MCH 27.8 28.3 28.0 26.8   MCHC 33.0 34.1 33.7 32.9   RDW 16.3* 16.0* 17.2* 17.2*   * 38* 47* 46*     INR  Recent Labs   Lab 07/21/20  0810 07/21/20  0400 07/20/20 2356 07/20/20 2120   INR 4.69* 4.02* 3.87* 3.03*       A/P:   Ivanna Espinoza is a 50 year old Hmong-speaking male with no known past medical history currently admitted with severe acute liver injury and acute kidney injury. ENT was consulted for epistaxis management given presence of nasal packing on admission that was reportedly placed around 7/12-7/13 at OSH. At time of exam, excoriation noted along the posterior oropharyngeal wall, likely 2/2 intubation trauma and the oropharynx was packed with Afrin soaked kerlix gauze roll. Decision " made to leave the Rapid Rhino nasal balloon packing in place at this time given patient's tenuous status and severe coagulopathy.      - Keep oropharyngeal packing in place for now. ENT to reassess tomorrow and coordinate packing exchange with placement of oral access (will communicate with MICU)  - Apply Afrin to oropharyngeal gauze Q4H and PRN for bleeding. Keep packing moist at all times with Afrin and/or saline  - Nasal saline spray Q2H to b/l nasal passages to keep moist  - ENT will remove nasal packing once patient is more stable- no signs of septal necrosis/pressure injury on exam 7/20 however will slowly deflate balloon over coming days in anticipation of eventual removal.    - Continue antibiotics to cover staph/TSS while nasal packing and oropharyngeal packing remain in place. Currently receiving IV Zosyn.     -- Patient and above plan discussed with Dr. Jens Barr, PGY1   Otolaryngology-Head & Neck Surgery  Please contact ENT with questions by dialing [phone] and entering job code [phone] when prompted

## 2020-07-21 NOTE — PROCEDURES
Bronchoscopy    PROCEDURE:   Bronchoscopy with RML Bronchoalveolar lavage     INDICATION:  Septic shock w/concern for possible blastomycosis     PROCEDURE :   Varsha Hester MD     SUPERVISOR:  Dr Greer    CONSENT:  Obtained and in the paper chart    UNIVERSAL PROTOCOL: Patient Identification was verified, time out was performed. Imaging data reviewed. Barrier precaution done: Hands washed, mask, gloves, gown, and eye protection all used.     MEDICATIONS: On fentanyl gtt    PROCEDURE: Patient monitored during entire procedure. 5mL of lidocaine instilled via ET tube with minimal cough.  Bbronchoscope was inserted through patients ET tube.  The ET tube was in good position.  The wai was sharp.  An airway inspection was done to the subsegmental level which found no mucosal ulcerations, mucous or debris.  The bronchoscope was then advanced to the RML and placed into wedge.  A BAL was performed where  60mL of saline were instilled in 60mL aliquots.  A total of 30mL were collected.       SAMPLES:   30 mL of  fluid were sent for infectious and cytologic evaluation.    Dr Gerer was present for the procedure.    COMPLICATIONS: None    Varsha Hester MD  Pulmonary and Critical Care Fellow

## 2020-07-21 NOTE — PROGRESS NOTES
MEDICAL ICU PROGRESS NOTE  07/21/2020    Date of Hospital Admission: 7/20/2020  Date of ICU Admission: 7/20/2020  Reason for Critical Care Admission: Renal failure; Liver failure w/ SOB, hematochezia, hematemesis  Date of Service (when I saw the patient): 07/21/2020    ASSESSMENT: Ivanna Espinoza is a 50 year old male with unknown past medical history recently admitted at [facility] in [place], [place] for reported liver failure (unclear on details) presented to [facility] ED on 7/19 with SOB, hematochezia, and hematemesis and was then found to have acute liver injury and acute renal failure. Admitted to the MICU from the ED overnight 7/19-7/20.    CHANGES and MAJOR THINGS TODAY:   PLEASE SEE INTERVAL HISTORY BELOW A&P FOR FURTHER HISTORY PER PATIENT'S SISTER  - Pressor goal is SBP > 100  - Trending labs q6hrs  - See A&P for full list of tests pending and ordered  - Stress dose steroids: hydrocortisone 50mg q6hrs  - Discontinued octreotide gtt and albumin challenges (ok'd with GI and renal)  - Discontinued acyclovir as HSV is negative  - CRRT started; NaHCO3 discontinued  - 1 U PRBCs, 1 U FFP, 10 U cryo given  - D5W bolus given for hypernatremia from correcting bicarb with NaHCO3  - Bronchoscopy w/ following labs: cell count w/ diff, aerobic bacterial cx, gram stain, AFB cx, AFB stain, fungus cx, KOH prep, cytology non gyn, CMV DNA quant, Legionella cx, RSV panel PCR, Nocardia cx, Galactomannan antigen, Pneumocystis jirovecii PCR  - Transfuse FFP if INR < 5  - Transfuse pRBCs if Hgb <7  - Transfuse platelets if Plt <20  - Transfuse cryoprecipitate if fibrinogen <150  - Preserve volume status as much as possible; limit drips and blood products to minimum required      Antimicrobials: Zosyn, Vancomycin, Doxycycline, Amphotericin B liposomal  Ventilator: R 24 /  / PEEP 14 / FiO2 100  Sedation: Fentanyl infusion with bumps  Pressors: Levo, Vaso, Phenyl, Angio (goal to wean off Phenyl first with the starting of  "Angio)          PLAN:  Neuro/HEENT:  Pain/Sedation: Fentanyl  mcg/hr infusion w/ 25-50mcg prn bumps    CTH normal.    Nasal bleeding  Pt has nasal balloon for 8-9 days of nose bleeding. 2/2 current coagulopathy. Started bleeding from L nare.  - ENT says to keep R nasal balloon in for now; may put balloon in L nare tomorrow    Pharyngeal bleeding  Bleeding in back of throat from intubation most likely.  - ENT packed back of throat and following      ===CARDIOVASCULAR===  #. Cardiomegaly   Noted on admission CXR. TTE showed \"severely dilated aortic root when indexed to body surface area\".  - Recommended to do CTA/MRA in future, but not now considering patient's renal function and likely not contributing to his current clinical status    #. Hypotension, hemorrhagic shock  Developed hypotension lolis-intubation and continued afterwards. Originally only on levo, but vaso and phenylephrine added overnight 7/20-21. Continued hypotension likely due to hemorrhagic shock; septic shock could be present and contributing as well.  - Levo, Vaso, Luke, Angio for SBP > 100  - Goal to wean off phenyl if possible with starting of Angio  - Adjusted high range for Levo to 0.1 mcg/kg/min      ===RESPIRATORY===  #. Tachypnea 2/2 metabolic acidosis compensation  #. ARDS +/- volume overload  Metabolic acidosis with respiratory alkalosis compensation. Working hard to compensate for metabolic acidosis. CXR on admission unremarkable. COVID-19 negative. Was on BiPAP briefly, but ultimately intubated today for increased WOB and fatigue. PEEP increased to 10 overnight on 7/20 for ARDS.   - Goal to pull off fluid on CRRT when blood pressure allows  - ABGs [id number]    Ventilation Mode: CMV/AC  (Continuous Mandatory Ventilation/ Assist Control)  FiO2 (%): 70 %  Rate Set (breaths/minute): 24 breaths/min  Tidal Volume Set (mL): 450 mL  PEEP (cm H2O): 10 cmH2O  Oxygen Concentration (%): 70 %  Resp: 26      ===GASTROINTESTINAL===  #. Acute liver " "failure  Recent admission at [facility] ([place], [place]) for \"liver problem\". Transaminitis, total bilirubinemia. Elevated alk phos. Significantly elevated Ferritin 91,297. Low TIBC, high iron saturation index 146. Significantly elevated LDH. Unable to test triglycerides due to bilirubinemia.     - Hepatology on board  - ID on board    Diagnostics:   - Initial acetaminophen 25.8 and now 12.6.  - EtOH, volatile alcohols, cocaine, opiates, cannabinoids, benzodiazepines negative  - Many drug/toxin levels still pending  - Autoimmune hepatitis w/u: JORGITO negative; cryoglobulin, F Actin EIA all pending  - A1AT pending  - Ceruloplasmin normal  - Abd US Doppler normal  - RUQ U/S normal except mild gallbladder sludge  - CT chest/abd/pelvis consistent with the volume patient has gotten in the setting of his acute liver and renal failure    - Respiratory PCR negative  - CMV IgG positive. CMV DNA quant negative  - EBV Capsid antibody negative. DNA PCA quant pending  - HBV, HCV, HIV DEVON pending  - HIV Ag/Ab negative  - Resolved previous HAV infection: HAV IgG reactive. HAV IgM negative.  - Resolved previous HBV infection: HBVc IgG positive, HBVc IgM negative, HBVs Ab positive, HBVsAg negative  - Hepatitis C antibody negative  - Hepatitis D IgM pending  - Hepatitis E IgG and IgM pending  - Leptospira IgM pending  - Treponema antibody negative  - Adenovirus PCR pending  - Coccidioides, Histoplasma pending  - Rickettsia rickettsii pending  - Anaplasma pending  - E. Histolytica pending  - Strongyloides pending  - Karius assay pending  - Hantavirus IgG and IgM pending  - Quantiferon TB Gold Plus pending   - Soluble IL-2RA pending  - Yeast blood culture pending    - Transplant evaluation (per charting, unable to get liver transplant as not a [place] citizen and does not have insurance to cover a liver transplant).   - PSA total and free pending   - PFTs pending   - Ethyl glucuronide pending   - PEth pending     Therapies:  - NAC 6.25 " mg/kg/hr  - Doxycycline 100mg q12hrs  - Vancomycin renally dosed  - Zosyn 4.5 g q6hrs  - IV Protonix 40mg BID  - Vitamin K x3 days (today and tomorrow left)    MELD-Na score: 46 at 7/21/2020  4:00 AM  MELD score: 46 at 7/21/2020  4:00 AM  Calculated from:  Serum Creatinine: 7.01 mg/dL (Rounded to 4 mg/dL) at 7/21/2020  4:00 AM  Serum Sodium: 141 mmol/L (Rounded to 137 mmol/L) at 7/21/2020  4:00 AM  Total Bilirubin: 16.3 mg/dL at 7/21/2020  4:00 AM  INR(ratio): 4.02 at 7/21/2020  4:00 AM  Age: 50 years 2 months    #. Nutrition  Strict NPO  - Consider doing OG tomorrow to coordinate with ENT packing of mouth     #. GI bleed, resolved currently  Noted clots via rectum when he came in . No prior records. No GIB in ICU per RN.  - PPI IV BID    ===RENAL / ELECTROLYTES===  #. AZAM  Unclear baseline or etiology. Unclear if hx of kidney disease. FeNa 2.2% and high creatinine indicating early AZAM in setting of CKD or intrarenal injury.  - Nephrology consulted; CRRT currently at 1:1 I:O  - Phan placed for strict I/Os    #. HAGMA  Likely 2/2 AZAM, cannot r/o other toxins. Lactate normal at the time of initial acidosis. Lactate uptrending to 11.5 this morning. Negative for ethylene glycol and propylene glycol. Volatiles screen negative.  - Bicarb gtt per nephrology  - ABGs q6hrs  - Volatile screen pending  - Ethylene glycol pending     #. Hypocalcemia  PTH elevated and no hx of CKD likely indicating likely 2/2 to AZAM, acute liver failure, and severe illness.  - Replete with calcium gluconate / calcium chloride as appropriate (consider when giving blood products)  - Ionized calcium q4hrs  - 1,25 Dihydroxyvitamin D pending  - 25 Hydroxyvitamin D2 and D3 pending        ===INFECTIOUS DISEASE===     #. Fever  Fever at home per ED note. Fever documented in ICU. TMax 102.8.   - Zosyn, Vanc, Doxycycline, Ampho B  - Follow UCx and Bcx  - Respiratory panel PCR pending  - Most ID workup/treatment listed in GI  section    Antimicrobials:  Amphotericin B -now  Doxycycline -now  Vancomycin -now (renally dosed)  Zosyn -now       ===HEMATOLOGY===  #. Normocytic anemia  GI bleed. ED gave him 1U pRBCs for Hgb 8. Given multiple blood products so far in the ICU.  - Transfuse for Hgb < 7  - Pathologist review of blood smear pending   - Hematology on board     #. Coagulopathy  #. Thrombocytopenia  2/2 acute liver failure per hematology. Factor V assay low. Factor VIII assay normal.  - Vitamin K IV x3days ordered (today and tomorrow left)  - Trend fibrinogen, INR  - Tranfuse Plt if Plt > 20  - Transfuse FFP if INR > 5  - Transfuse cryoprecipitate if fibrinogen <150     ===ENDOCRINE===  #. Hypoglycemia  In the setting of acute liver injury. Dextrose requirement uptrended to D40 between admission and  early AM; also required an amp of D50.  - D40 infusion    Elevated fT4, decreased TSH  Likely 2/2 to poor clinical status.  - Nothing to do right now      ===SKIN CARE===  No active issues      GI PPx: PPI IV BID  DVT PPx: coagulopathic; SCDs  CODE: FULL code (confirmed)  FAMILY: Updated sister today and obtained history listed below    Patient seen and discussed with staff attending, Dr. Greer.         Malick Coronado MD  Internal Medicine PGY-1  [phone]      -----------------------------------------------------------------------    INTERVAL HISTORY:  History obtained this morning from sister who is an oncology nurse:  Was in [place] visiting his cousin. States pt went to an ER in [place] (not the one in [place], [place]) where they used a cocaine-based medication to stop his epistaxis. He then went to [facility] in [place], [place] where he tested positive for cocaine on his UDS.  No hx of liver or kidney disease. Negative PMHx. Negative PSHx. Negative FHx; father  at 89, mother and two sisters still alive and all healthy.  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 No work since  moving here. Lives with his sister in [place]. Relies on his two sisters (one lives in [place] about 3 hours away driving; the other lives in [place]). The house they live in is about 30 years old. No abnormal chemicals in or around the house. He has a wife and five children who all live in [facility]. Moved here in 11/2018 because his brother-in-law, who he is close with, developed brain cancer and he wanted to be here during the treatment/ongoing care of that. Was planning on going back to [facility] once that situation resolved one way or the other. Pt drinks 1-2 bottles of beer/week.     Overnight last night: patient's temp increased up to 102. Blood bank was tested for transfusion reaction but was negative. 2 U cryo and 2 U pRBCs for 1700 labs (Hgb 5.4). 6 grams calcium chloride given overnight. Switched to D40 drip; given D50. Started on Vaso and phenylephrine for hypotension. Changed PEEP to 10 c/f ARDS. Spoke with nephro about uptrending potassium who ordered CRRT, but needs line. Lactate uptrended to 7. Osmolality >320 this AM per hepatology recommendations; ordered q12hrs. Given 1 U platelets and 1 U PRBCs for 0400 labs. Given 5 U cryo for fibrinogen 93. Prepared 2 U FFP w/ one to transfuse for INR 4.    Overnight resident also updated sister and explained that patient is critically ill.      PHYSICAL EXAMINATION (this morning prior to intubation):  Temp:  [97.4  F (36.3  C)-102.8  F (39.3  C)] 100.7  F (38.2  C)  Pulse:  [107] 107  Heart Rate:  [] 115  Resp:  [18-36] 26  BP: ()/(35-92) 98/47  MAP:  [30 mmHg-89 mmHg] 64 mmHg  Arterial Line BP: ()/(37-77) 95/54  FiO2 (%):  [30 %-100 %] 70 %  SpO2:  [53 %-100 %] 93 %  GEN: Sedated. Intubated  NEURO: Sedated on ventilator.  HEENT: NCAT, scleral icterus, oral pharynx packed by ENT with gauze.  LUNG: CTAB, no wheezes  CV:  RRR, no murmurs  ABD: +BS, distended, very firm. Unable to assess tenderness pt is sedated.  EXT: wwp, no edema   SKIN: Jaundiced  skin. Petechiae seen on exam on flexor surface of legs and arms. Petechiae on chest and anterior shoulders with confluence into purpura.    LABS: Reviewed.   Arterial Blood Gases   Recent Labs   Lab 07/21/20  0359 07/21/20  0005 07/20/20  2259 07/20/20  2204   PH 7.29* 7.22* 7.19* 7.19*   PCO2 40 53* 53* 52*   PO2 83 65* 84 71*   HCO3 19* 21 20* 20*     Complete Blood Count   Recent Labs   Lab 07/21/20  0400 07/20/20  2356 07/20/20 2120 07/20/20  1722   WBC 1.1* 1.3* 1.3* 1.2*   HGB 7.0* 6.1* 5.2* 5.4*   PLT 38* 47* 46* 56*     Basic Metabolic Panel  Recent Labs   Lab 07/21/20  0400 07/20/20  2356 07/20/20 2052 07/20/20  1429    138 137 132*   POTASSIUM 5.2 5.6* 5.5* 4.6   CHLORIDE 101 102 100 99   CO2 19* 21 19* 18*   BUN 98* 94* 94* 96*   CR 7.01* 6.63* 6.62* 6.27*   * 58* 58* 88     Liver Function Tests  Recent Labs   Lab 07/21/20  0400 07/20/20  2356 07/20/20 2120 07/20/20  1429 07/20/20  0952 07/20/20  0645 07/20/20  0345 07/19/20  2233   AST 9,141*  --   --   --  5,392*  --  6,002* 6,396*   ALT 2,553*  --   --   --  1,794*  --  1,860* 1,969*   ALKPHOS 607*  --   --   --  536*  --  608* 653*   BILITOTAL 16.3*  --   --   --  17.6* 17.3* 18.0* 19.7*   ALBUMIN 1.4*  --   --   --  1.2*  --  1.4* 1.4*   INR 4.02* 3.87* 3.03* 4.92*  --   --  5.35* 8.05*     Coagulation Profile  Recent Labs   Lab 07/21/20  0400 07/20/20  2356 07/20/20  2120 07/20/20  1429  07/19/20  2233   INR 4.02* 3.87* 3.03* 4.92*   < > 8.05*   *  --  110* 133*  --  212*    < > = values in this interval not displayed.       IMAGING:  Recent Results (from the past 24 hour(s))   US Abd/Pelvis Duplex Complete Portable    Narrative    Exam: Complete abdominal ultrasound with Doppler , 7/20/2020.    History: Acute liver failure    Comparison: The same day abdominal ultrasound.    Findings: See same day abdominal ultrasound for grayscale evaluation.  Findings obscured by motion artifact with rapid respiration rate as  per  technologist.    Doppler:   Flow in the portal venous system is towards the liver:  27 cm/sec in the main portal vein   27 cm/sec in the right portal vein  16 cm/sec in the left portal vein.    Flow in the hepatic artery is towards the liver:  81 cm/sec peak systolic  33 cm/sec minimum diastolic flow   0.59 resistive index.    The hepatic venous system is patent with flow towards the IVC.  The mid splenic vein is patent with flow towards the liver.  The IVC is patent with flow towards the heart.      Impression    Impression: Patent hepatic vasculature doppler evaluation allowing for  increased respiration rate. See same day ultrasound for grayscale  evaluation of the abdomen.    I have personally reviewed the examination and initial interpretation  and I agree with the findings.    RUSS MOLINA MD   Echo Complete    Narrative    [id number]  [id number]  [id number]  [id number]^NAVEEN^ADELAIDE^A           [facility],[place]  Echocardiography Laboratory  [address] [place] [id number]     Name: RASHAWN SPANN  MRN: [id number]  : 1970  Study Date: 2020 10:29 AM  Age: 50 yrs  Gender: Male  [place] [facility] [place] Study: SOB, CP  Ordering Physician: ADELAIDE HODGE  Performed By: Gustavo Dalal RDCS     BSA: 1.4 m2  Height: 59 in  Weight: 111 lb  HR: 107  BP: 113/82 mmHg  _____________________________________________________________________________  __        Procedure  Complete Portable Echo Adult.  _____________________________________________________________________________  __        Interpretation Summary  Severely dilated aortic root when indexed to body surface area, measuring 4.4  cm (3.1 cm/m2.) Recommend CTA/MRA of the aorta to better assess aortic  dimensions.  Left and right ventricular function, chamber size, wall motion, and wall  thickness are normal.The LVEF is 60-65%.  Mild aortic insufficiency is present.  A right pleural effusion is  present.  Previous study not available for comparison.  _____________________________________________________________________________  __        Left Ventricle  Left ventricular function, chamber size, wall motion, and wall thickness are  normal.The EF is 60-65%. Diastolic function not assessed due to tachycardia.     Right Ventricle  Right ventricular function, chamber size, wall motion, and thickness are  normal.     Atria  Both atria appear normal.     Mitral Valve  The mitral valve is normal.        Aortic Valve  Mild aortic insufficiency is present.     Pulmonic Valve  The pulmonic valve is normal.     Vessels  The inferior vena cava cannot be assessed. Sinuses of Valsalva 4.4 cm (3.1  cm/m2)  Proximal ascending aorta 3.6 cm (2.6 cm/m2). Unable to assess mean RA pressure  given the patient is on a ventilator.     Pericardium  No pericardial effusion is present.     Miscellaneous  A right pleural effusion is present.        Compared to Previous Study  Previous study not available for comparison.  _____________________________________________________________________________  __  MMode/2D Measurements & Calculations     IVSd: 1.1 cm  LVIDd: 4.1 cm  LVIDs: 2.6 cm  LVPWd: 0.81 cm  FS: 35.5 %  LV mass(C)d: 122.9 grams  LV mass(C)dI: 85.6 grams/m2  asc Aorta Diam: 3.6 cm  LVOT diam: 2.3 cm  LVOT area: 4.0 cm2  LA Volume Index (BP): 27.4 ml/m2  RWT: 0.40           Doppler Measurements & Calculations  PA acc time: 0.11 sec     _____________________________________________________________________________  __           Report approved by: Nury Rodarte 07/20/2020 11:31 AM      XR Chest Port 1 View    Narrative    EXAM: XR CHEST PORT 1 VW  7/20/2020 12:13 PM     HISTORY:  Verify PICC       COMPARISON:  Chest x-ray 7/19/2020    FINDINGS:   Right upper extremity PICC with tip terminating at the proximal right  atrium.    The trachea is midline. The cardiomediastinal silhouette is slightly  enlarged.. The pulmonary  vasculature are prominent.     The included osseous thorax, soft tissues and upper abdomen and are  within normal limits.     New right-sided pleural effusion. Bibasilar mixed  interstitial/airspace opacities.       Impression    IMPRESSION:  1. Right upper extremity PICC with tip terminating in the proximal  right atrium.  2. New right pleural effusion with mixed opacities suggestive of  pulmonary edema. Recommend follow-up to clearing to exclude infection.    I have personally reviewed the examination and initial interpretation  and I agree with the findings.    RIOS SCHWARTZ MD   XR Chest Port 1 View   Result Value    Radiologist flags Endotracheal tube malpositioning (Urgent)    Narrative    Exam: XR CHEST PORT 1 VW, 7/20/2020 3:06 PM    Indication: Verify PICC    Comparison: Chest x-ray same day    Findings:   Interval intubation. ET tube is at the level of the wai,  approaching the right mainstem bronchus. Right upper extremity PICC  line overlies the cavoatrial junction.    Trachea is midline. Normal cardiac size. No pneumothorax. Bilateral  pleural effusions. Relatively unchanged bilateral hazy interstitial  opacities. No acute osseous or soft tissue abnormalities. No acute  upper abdominal pathology.      Impression    Impression:   1. Interval intubation with ET tube just proximal to the right  mainstem bronchus. Recommend retraction.  2. No relative change in bilateral pulmonary edema  3. Right upper extremity PICC line overlies the cavoatrial junction    [Urgent Result: Endotracheal tube malpositioning]    Finding was identified on 7/20/2020 3:10 PM.     Dr Darío Coronado was contacted by Dr. Casey Gannon at 7/20/2020  3:31 PM and verbalized understanding of the urgent finding.      I have personally reviewed the examination and initial interpretation  and I agree with the findings.    TITUS SHEEHAN MD   CT Chest Abdomen Pelvis w/o Contrast    Narrative    EXAMINATION: CT CHEST ABDOMEN  PELVIS W/O CONTRAST, 7/20/2020 4:21 PM    TECHNIQUE:  Helical CT images from the thoracic inlet through the  symphysis pubis were obtained without IV contrast.     COMPARISON: Earlier same day chest x-ray, abdominal ultrasound  7/20/2020.    HISTORY: poor clinical condition; AZAM and acute liver injury w/  unknown PMHx    FINDINGS:  CHEST:  LUNGS: Endotracheal tube within the mid thoracic trachea cephalad to  the wai. Small amount of debris within the dependent trachea to the  level of the wai. The central airways are patent. Moderate-sized  bilateral pleural effusions. Groundglass and consolidative opacities  in a parahilar and dependent distribution.    MEDIASTINUM: Right upper extremity approach PICC with the tip near the  cavoatrial junction. The heart size is mildly enlarged. No pericardial  effusion. Their main pulmonary artery diameters are within normal  limits. Normal appearance and configuration to the great vessels off  the aortic arch. Multiple prominent mediastinal lymph nodes example 3  mm intrapulmonary lymph node. No suspicious hilar or axillary lymph  nodes. The thyroid gland is unremarkable.    ABDOMEN/PELVIS:  LIVER: Hepatomegaly. No definitive intrahepatic mass.    BILIARY: Gallbladder wall edema with subtle hyperdensity near the  gallbladder neck consistent with biliary sludge.     PANCREAS: Within normal limits.    SPLEEN: Within normal limits.    ADRENAL GLANDS: Within normal limits.    URINARY TRACT: The kidneys are unremarkable. No hydronephrosis or  hydroureter. Urinary catheter with balloon inflated within the urinary  bladder.    REPRODUCTIVE ORGANS: Within normal limits.    STOMACH: Within normal limits.    BOWEL: Diffuse large and small bowel wall edema. No abnormally dilated  loops of large or small bowel. The appendix is unremarkable.    PERITONEUM/FLUID: No free fluid.    VESSELS: No aneurysmal dilatation of the abdominal aorta.    LYMPH NODES: Multiple prominent mesenteric  lymph nodes, favored to be  reactive.    BONES/SOFT TISSUES: No aggressive osseous lesions. Mild degenerative  changes of the thoracic spine. Diffuse anasarca.      Impression    IMPRESSION:   1. Groundglass and consolidative opacities with moderate bilateral  pleural effusions, most suggestive of pulmonary edema. Infection is  not excluded.  2. Diffuse bowel wall edema and gallbladder wall edema suggestive of  fluid overload/hypoalbuminemia in a patient with acute hepatic and  renal failure.  3. Cardiomegaly.  4. Appropriate positioning of supportive devices as detailed above.    I have personally reviewed the examination and initial interpretation  and I agree with the findings.    LUANA CATALAN MD   CT Head w/o Contrast    Narrative    CT HEAD W/O CONTRAST 7/20/2020 4:22 PM    History: poor clinical condition; AZAM and acute liver injury w/  unknown PMHx   ICD-10:    Comparison: None    Technique: Using multidetector thin collimation helical acquisition  technique, axial, coronal and sagittal CT images from the skull base  to the vertex were obtained without intravenous contrast.    Findings: There is no intracranial hemorrhage, mass effect, or midline  shift. Gray/white matter differentiation in both cerebral hemispheres  is preserved. Ventricles are proportionate to the cerebral sulci. The  basal cisterns are clear.    The bony calvaria and the bones of the skull base are normal.  Scattered mucosal thickening seen in bilateral frontal, ethmoid,  sphenoid sinuses and the right maxillary sinus. Bilateral mastoid  effusions.       Impression    Impression:   No acute intracranial pathology.    I have personally reviewed the examination and initial interpretation  and I agree with the findings.    MARIE METCALF MD   XR Chest Port 1 View    Narrative    Preliminary Report - The following report is a preliminary  interpretation.      Impression    Impression:   1. Slightly improved pulmonary edema.  2.  Support devices in stable position.

## 2020-07-21 NOTE — CONSULTS
Laboratory Medicine and Pathology  Transfusion Medicine- Transfusion Reaction    Ivanna Espinoza MRN# [id number]   YOB: 1970 Age: 50 year old   Date of Reaction: 7/20/2020       Transfusion Reaction Evaluation   Impression  - Febrile non-hemolytic transfusion reaction    Recommendation    1. Transfuse as needed.    2. No evidence of immune-mediated hemolysis  3,   If positive, final culture results will be called to the clinical team and reported in an addendum.     ----------------------------------    History  Ivanna Petersen a 50 year old male with an unknown past medical history who was admitted to the MICU from the ED on 7/19 for shortness of breath, hematochezia, hematemesis, and acute liver injury and acute renal failure (AST level on admission was 9,141 with an ALT of 2,553). He is currently intubated. He was transfused multiple blood products upon admission for an initial hgb of 5.2 g/dL. On 7/20/2020 he was transfused one unit of plasma, one unit of cryo, one unit of platelets, and one unit of PRCBs. The unit of PRBCs thought to be implicated in the reaction (unit# [id number]) was started at 2015 and stopped at 2035. The unit of cryo had been completed at 2000, the plasma at 1830, and the platelets at 1615. The PRBC infusion was stopped due to fevers after ~37 mL had been transfused. The patient's fever continued to develop, reaching a maximum of 39.3 C at 0215 on 7/21/2020.    Reported Symptoms  Fever    Vitals    Pre-transfusion vital signs (taken at 2015 on 7/20/2020)  Blood pressure 99/51 mm Hg   Pulse 107 bpm   Temperature 37.4 C   Respiratory Rate 24 per minute   O2 Saturation N/A     Vitals signs during suspected reaction (taken at 2035 on 7/20/2020)  Blood pressure 97/50 mm Hg   Pulse 110 bpm   Temperature 37.8 C (38.3 C at 2310)   Respiratory Rate N/A    O2 Saturation NA       Blood Bank Investigation  Product Type: PRBCs, platelet, cryo, plasma  Unit Number: ([id number],  [id number], [id number], [id number])  Amount Remainin mL, not returned, 0 mL, not returned (respectively)  Post-Transfusion Clerical Check: Correct  ABO/Rh: The unit type was (O+ PRBC, A+ platelet, O+ cryo, AB plasma) and the patient was O+. The unit was compatible.  EDD: negative  Post-Transfusion Plasma: straw colored    Gram stain showed no organisms and culture is no growth to date, pending final (units [id number] and [id number]).    [facility]  Case Definition: Definitive  Severity: Non-severe  Imputability: Possible      Jose Luis Meng MD  2020 11:02 AM  Transfusion Medicine Fellow  Pager: [phone]      Attestation:   I have reviewed the pertinent lab and clinical data, I have discussed this patient's work-up with the fellow (Dr. Emilio Meng), and I agree with the impression and recommendation, as outline in his note.  This reaction fits the definition of non-severe, definitive [facility] of possible imputability.  If microbial cultures show growth, the team will immediately be notified and an addendum will be written.    Arpan Johnson M.D.  Professor, Transfusion Medicine  Laboratory Medicine & Pathology  Pager: [phone]

## 2020-07-21 NOTE — PROGRESS NOTES
"  Hematology - Inpatient Consult Service  Progress Note   Date of Service: 07/21/2020    Patient: Ivanna Espinoza  MRN: [id number]  Admission Date: 7/19/2020  Hospital Day # 1      Interval History:  CRRT was started overnight due to hyperkalemia and persistent acidosis despite bicarbonate drip. Transfusion reaction was sent and it was negative. He received 3 units pRBC's, 2 units platelets, 3 units plasma and 3 units cryoglobulin since yesterday.     Physical Exam:    Blood pressure 98/47, pulse 107, temperature 96.5  F (35.8  C), temperature source Axillary, resp. rate 30, height 1.52 m (4' 11.84\"), weight 57.4 kg (126 lb 8.7 oz), SpO2 96 %.  General: Intubated and sedated  HEENT: Nasal packing in place  CV: RRR  Resp: Mechanical  GI: Soft abdomen, non-distended    Medications:  I reviewed all inpatient medications.     Labs & Studies:   Recent Labs   Lab 07/21/20  1516 07/21/20  1140 07/21/20  1001 07/21/20  0810 07/21/20  0400 07/20/20  2356 07/20/20  2052 07/20/20  1722  07/20/20  0952  07/20/20  0645 07/20/20  0345  07/19/20  2233     --  143  --  141 138 137  --    < > 133  --   --  130*   < > 128*   POTASSIUM 4.0  --  4.6  --  5.2 5.6* 5.5*  --    < > 4.6  --   --  5.2   < > 5.4*   CHLORIDE 100  --  104  --  101 102 100  --    < > 102  --   --  105   < > 106   CO2 14*  --  18*  --  19* 21 19*  --    < > 12*  --   --  10*   < > 7*   ANIONGAP 24*  --  21*  --  21* 16* 17*  --    < > 19*  --   --  14   < > 15*   *  --  101*  --  119* 58* 58*  --    < > 67*  --   --  75   < > 65*   BUN 76*  --  90*  --  98* 94* 94*  --    < > 97*  --   --  99*   < > 92*   CR 5.33*  --  6.76*  --  7.01* 6.63* 6.62*  --    < > 6.05*  --   --  6.42*   < > 6.80*   GFRESTIMATED 12*  --  9*  --  8* 9* 9*  --    < > 10*  --   --  9*   < > 9*   GFRESTBLACK 14*  --  10*  --  10* 10* 10*  --    < > 12*  --   --  11*   < > 10*   TAI 7.8*  --  7.2*  --  6.4* 8.5 5.8*  --    < > <5.0*  --   --  5.2*   < > 5.1*   MAG 2.3 2.5*  -- "  2.6* 2.5* 2.5* 2.5*  --   --   --   --   --  2.8*   < >  --    PHOS 9.3*  --   --   --  12.7*  --  12.7* 12.1*  --  9.3*   < >  --   --   --   --    PROTTOTAL  --   --   --   --  3.4*  --   --   --   --  3.5*  --   --  3.7*  --  3.9*   ALBUMIN  --   --   --   --  1.4*  --   --   --   --  1.2*  --   --  1.4*  --  1.4*   BILITOTAL  --   --   --   --  16.3*  --   --   --   --  17.6*  --  17.3* 18.0*  --  19.7*   ALKPHOS  --   --   --   --  607*  --   --   --   --  536*  --   --  608*  --  653*   AST  --   --   --   --  9,141*  --   --   --   --  5,392*  --   --  6,002*  --  6,396*   ALT  --   --   --   --  2,553*  --   --   --   --  1,794*  --   --  1,860*  --  1,969*    < > = values in this interval not displayed.     CBC  Recent Labs   Lab 07/21/20  1523 07/21/20  1001 07/21/20  0810 07/21/20  0400   WBC 0.6* 0.8* 0.9* 1.1*   RBC 3.08* 2.00* 2.27* 2.47*   HGB 9.1* 5.7* 6.3* 7.0*   HCT 27.5* 17.1* 19.1* 20.5*   MCV 89 86 84 83   MCH 29.5 28.5 27.8 28.3   MCHC 33.1 33.3 33.0 34.1   RDW 16.2* 16.5* 16.3* 16.0*   PLT 46* 72* 140* 38*     INR  Recent Labs   Lab 07/21/20  1523 07/21/20  1001 07/21/20  0810 07/21/20  0400   INR 5.96* 4.85* 4.69* 4.02*       Assessment & Recommendations:   #Pancytopenia  #Neutropenia  It is most likely related to myelosuppression from acute liver failure, renal failure and critical illness in the setting of baseline cytopenias from possible prior alcohol use. Reticulocyte count inappropriately low normal at 38.3 which goes in favor of a hypo proliferative cause for anemia/pancytopenia rather than hemolysis. Peripheral smear showed only rare schistocytes which is not consistent with TTP. No splenomegaly on exam. HIV, HBV, HCV and HSV studies are negative. Labs not consistent with hypothyroidism (TSH low at 0.1 and fT4 elevated at 1.76). CT CAP on 7/20 showed pulmonary and diffuse bowel wall edema but was otherwise negative for an acute focal process.   - No indication for a bone marrow  biopsy as it would not change the clinical management.   - Continue supportive cares per the primary team.  - Transfuse to keep Hgb>7.0 and platelets>20k.      #Coagulopathy  #Elevated INR  #Hypofibrinogenemia  Elevated INR and PTT and reduced fibrinogen is related to impaired liver synthetic function and acute critical illness. No evidence of high-grade DIC.   - Monitor coags.  - Attempt to keep fibrinogen>100 with cryoprecipitate or RiaSTAP. Plasma can worsen volume overload and should be judiciously used. Keep INR<2.0.     #Extreme hyperferritinemia  Ferritin on [id number]. Ferritin is stored in hepatocytes and can spill into the circulation with hepatocyte destruction or acute liver failure as in this case. HLH is considered less likely.  - Await EBV.  - Soluble IL-2RA levels are in process with the caveat that IL-2RA levels can go up with liver failure. Triglyceride level was ordered but cancelled by the lab due to inadequate specimen.     We will continue to follow this patient while hospitalized. Please do not hesitate to page with any questions or concerns. Patient was seen and plan of care was discussed with attending physician Dr. Noriega.    Guicho Munroe  Hematology/Oncology Fellow  Pager: [phone]

## 2020-07-21 NOTE — PROGRESS NOTES
"[facility]    Hepatology Follow-up    CC: jaundice    Dx: ALI with impending SHANNAN   AZAM on CRRT   Lactic acidosis   Multiorgan failure   Pancytopenia and coagulopathy   Probable substance use    24 hour events:   Had a probable transfusion reaction with fevers  Continues to bleed from his nose and mouth.  Urine output reducing, now on three pressors    Subjective:  Intubated and ventilated    Medications  Current Facility-Administered Medications   Medication Dose Route Frequency     heparin lock flush  5-15 mL Intracatheter Q24H     milk thistle xtra  2 capsule Oral Q12H     pantoprazole (PROTONIX) IV  40 mg Intravenous BID     phytonadione  10 mg Intravenous Daily     piperacillin-tazobactam  4.5 g Intravenous Q6H     sodium chloride (PF)  10 mL Intracatheter Q8H     vancomycin place mcdowell - receiving intermittent dosing  1 each Intravenous See Admin Instructions       Review of systems  A 10-point review of systems was negative, unless stated above    Examination  BP 98/47   Pulse 107   Temp 96.2  F (35.7  C) (Axillary)   Resp 26   Ht 1.52 m (4' 11.84\")   Wt 57.4 kg (126 lb 8.7 oz)   SpO2 92%   BMI 24.84 kg/m      Intake/Output Summary (Last 24 hours) at 7/21/2020 1247  Last data filed at 7/21/2020 1200  Gross per 24 hour   Intake 7547.23 ml   Output 212 ml   Net 7335.23 ml     General: Critically ill looking, intubated, bleeding from mouth and nose  CVS: tachcardia  Resp: ventilated  Abdomen: Soft  Extremities: anasarca  Neuro: Sedated     Laboratory  Lab Results   Component Value Date     07/21/2020    POTASSIUM 4.6 07/21/2020    CHLORIDE 104 07/21/2020    CO2 18 07/21/2020    BUN 90 07/21/2020    CR 6.76 07/21/2020       Lab Results   Component Value Date    BILITOTAL 16.3 07/21/2020    ALT 2,553 07/21/2020    AST 9,141 07/21/2020    ALKPHOS 607 07/21/2020       Lab Results   Component Value Date    WBC 0.8 07/21/2020    HGB 5.7 07/21/2020    MCV 86 07/21/2020    PLT 72 " 07/21/2020       Lab Results   Component Value Date    INR 4.85 07/21/2020       MELD-Na score: 48 at 7/21/2020 10:01 AM  MELD score: 48 at 7/21/2020 10:01 AM  Calculated from:  Serum Creatinine: 6.76 mg/dL (Rounded to 4 mg/dL) at 7/21/2020 10:01 AM  Serum Sodium: 143 mmol/L (Rounded to 137 mmol/L) at 7/21/2020 10:01 AM  Total Bilirubin: 16.3 mg/dL at 7/21/2020  4:00 AM  INR(ratio): 4.85 at 7/21/2020 10:01 AM  Age: 50 years 2 months        Assessment  50 year old [place] male with no known past medical history but notable for substance abuse (Cocaine + on UTox), admitted with severe acute liver injury and impending liver failure; further complicated by ischemic hepatitis, multiorgan dysfunction, lactic acidosis, pancytopenia, acute hypoxic respiratory failure and severe coagulopathy. He is on broad spectrum antibiotics, on CRRT and so far, work up for etiology is negative.   He remains critically ill and is on three pressors, with a lactate >10. Work up for transplant ongoing but social/financial issues as well as critically ill state (3 pressors) will likely preclude this.  Prognosis is grim    Recommendations  -- Continue NAC IV  -- Monitor labs for etiology  -- q2H neuro checks   -- Trend the following labs every 6 hours to every 8 hours hours              - CMP, Mag, Phosphorus              - CBC                - PT/INR   -- Check Ammonia BID, Serum OSM Q12 (goal >/= 320)  -- Broad spectrum antibiotics ongoing  -- Infection surveillance every 48 hours   -- Agree with CRRT  -- Monitor lactate  -- Continue supportive cares   -- PUD prophylaxis with IV PPI 40 mg daily  -- Appreciate consultation by ENT, Heme and Renal teams    Patient seen and discussed with attending physician, Dr. Leventhal Chimaobi Anugwom, MD  PGY 6  Transplant Hepatology Fellow  [phone]

## 2020-07-21 NOTE — PLAN OF CARE
4C PT: Cancel and hold, PT orders acknowledged and appreciated. Per RN, pt not appropriate for PT evaluation this date as medical needs > therapy needs. Will reschedule PT evaluation and initiate as indicated.

## 2020-07-21 NOTE — PROGRESS NOTES
"Bronchoscopy Risk Assessment Guidelines      A. Patient symptoms to consider when assessing pulmonary TB risk are:    I. Cough greater than 3 weeks; and fever, hemoptysis, pleuritic chest    pain, weight loss greater than 10 lbs, night sweats, fatigue, infiltrates on    upper lobes or superior segments of lower lobes, cavitation on chest    x-ray.   B. Patient risk factors to consider when assessing pulmonary TB risk are:    I. Exposure to known TB case, foreign-born persons (within 5 years of    arrival to [place]), residence in a crowded setting (correctional facility,     long-term care center, etc.), persons with HIV or immunosuppression.    Patients with symptoms and risk factors should generally be considered \"suspect risk\" and bronchoscopies should be performed in airborne precautions.    This patient has NO KNOWN RISK of Tuberculosis (proceed with bronchoscopy)    Specimens sent: yes  Complications: None  Scope used: #[id number] Adult  Attending Physician: Dr. Karla Chicas, RT on 7/21/2020 at 5:00 PM  "

## 2020-07-21 NOTE — PROGRESS NOTES
[facility] CRITICAL CARE STAFF NOTE    50 year old male with unknown past medical history with recent admission to [facility] in [place], [place] for reported liver failure presented to [facility] on 7/19 with SOB, hematochezia, and hematemesis and subsequently admitted to the ICU for worsening liver and kidney failure as well as worsening coagulopathy. Now with progressive shock, DIC and worsening lactic acidosis.     Overnight/Interim History:     Progressive worsening in clinical status overnight. Tmax 102.8. Pressor needs increased considerably. Oxygenation has worsened, now on a PEEP of 10, 70% FiO2. Received the following blood products overnight: 2 units cryo, 2 units PRBC    Early this AM received 1 prbc, 1 unit plt, 5 units cryo and 2 units FFP.    Acute Critical Care Issues/Key Findings:     Ivanna Espinoza is critically ill due to shock and hypoxemic respiratory failure.     Acute liver injury/failure: Unclear etiology but hypoperfusion versus toxic is possible.  Outside medical records reviewed, nothing especially helpful.  GI consulted and appreciate their recommendations.  Broad infectious work-up pending, viral w/u thus far negative.  Continue with NAC protocol and follow LFTs going forward. Patient not a liver transplant candidate.      Acute renal failure: Etiology likely prerenal in the setting of low flow state. Initiated on bicarb gtt yesterday with ongoing acidosis and is now anuric. Renal contacted overnight, HD line placed this AM and CRRT initiated. Biacrb gtt now off. Will speak with renal about need for continued albumin challenge.     Hypoxemic respiratory failure: Intubated 7/20 with worsening P/F ratio overnight, CT from yesterday shows likley pulmonary edema. Now on PEEP of 10 and 70% FiO2.  Follow blood gases. Continue vancomycin and Zosyn.    Shock: Etiology not entirely clear but shock has significantly worsened. Now on 3 pressors, lactate rising to > 10. Imaging from 7/20  showed no clear infectious source.  Have been volume resuscitating with blood products (see below).  Follow lactate and continue with broad-spectrum antibiotics.    Anion gap metabolic acidosis: likely combination of AZAM and rising lactic acidosis.  Other toxins cannot be entirely ruled out but volatile screen is negative. Discussion with family did not reveal any potential toxic ingestion. CRRT started this AM, biacrb gtt off.     Nasal/oropharyngeal bleeding: Patient presented to the hospital with a nasal balloon that had been in for roughly 8-9 days.  ENT consulted and suggested leaving the balloon in as there is less risk for toxic shock syndrome that would be seen with a nasal tampon.  Antibiotics as above. Oropharynx packed after bleeding noted following intubation.     Pancytopenia: Continues to have worsening pancytopenia of unclear etiology.  Hematology was consulted and thinks this is likely related to his ongoing liver failure, critical illness and possible infection.  Peripheral smear shows rare schistocytes and is not consistent with TTP.  Plan to transfuse for hemoglobin greater > 7 and platelets > 20. Now neutropenic, covering with abx as above.    Coagulopathy: progressive worsening in the last 24 hours. Picture consistent with DIC.  Multiple blood products given overnight and again today. Goal INR < 5, fibrinogen > 150. Receiving FFP, cryoprecipitate and IV vitamin K as per those goals.    Elevated ferritin: Admission ferritin greater than 91k.  HLH considered but appreciate hematology's input. TG and soluble IL2 level pending.     Diffuse rash: petechial rash initially noted on back and abdomen now more profuse. Likely related to thrombocytopenia and coagulopathy. Seems slightly better today, continue to monitor.     Family (sister in [place]) updated over the phone. Planning to come and see him, FULL CODe for now. Prognosis grim, given clinical trajectory, it is unlikely he will survive this admission.      Disposition: ICU    Rest per resident note from today.     The patient was seen and examined with the resident/fellow physician.  We have discussed the patient in detail and I agree with the findings, assessment, and plan as documented in their note from today. The plan was formulated in conjunction with pharmacy, ICU nurses, and respiratory therapist. I have personally reviewed today's vital signs, medications, laboratory and imaging results. I have reviewed all consults that have been ordered and are active for this patient.      Critical Care Time: 60 min. I spent this time (excluding procedures) personally providing and directing critical care services at the bedside and on the critical care unit.      Date of Service (when I saw the patient): 07/21/20    Mychal Greer MD   of Medicine  Division of Pulmonary, Allergy, Critical Care and Sleep Medicine   [facility]  Pager: [phone]

## 2020-07-21 NOTE — PLAN OF CARE
Major Shift Events:  Pt worked up for possible transfusion reaction. Lab work was negative and blood products were resumed. CRRT orders placed by the Renal team, but MICU attending did not want to initiate yet so did not place a dialysis access.  Plan: Start CRRT STAT. Continue replacing blood products and titrating vasopressors for MAP > 65.  For vital signs and complete assessments, please see documentation flowsheets.      Problem: Adult Inpatient Plan of Care  Goal: Plan of Care Review  Outcome: No Change  Flowsheets (Taken 7/21/2020 0400)  Plan of Care Reviewed With: patient     Problem: Bleeding (Gastrointestinal Bleeding)  Goal: Hemostasis  Outcome: No Change     Problem: Bleeding (Gastrointestinal Bleeding)  Goal: Hemostasis  Intervention: Manage Gastrointestinal Bleeding  Flowsheets (Taken 7/21/2020 0400)  Bleeding Management:   blood products administered   dressing monitored   packing maintained  Stabilization Measures: blood products administered  Environmental Support: calm environment promoted     Problem: Gastrointestinal Condition Comorbidity  Goal: Gastrointestinal Condition  Description: Patient comorbidity will be monitored for signs and symptoms of Gastrointestinal condition.  Problems will be absent, minimized or managed by discharge/transition of care.  Outcome: No Change     Problem: Gastrointestinal Condition Comorbidity  Intervention: Manage Acute Pain  Flowsheets (Taken 7/21/2020 0400)  Pain Management Interventions: medication (see MAR)

## 2020-07-21 NOTE — PLAN OF CARE
ICU End of Shift Summary. See flowsheets for vital signs and detailed assessment.    Changes this shift:   Pt is actively dying code status has been changed to DNR, CRRT initiated, Bronchoscopy performed at  as well.  Pt is maxed out  on 4 high dose vasopressors w/ levophed infusing at 1.0mcg/kg/min but unable to achieve blood pressure goals of SBP >100.  Therefore unable to pull fluid or even match I=0's via CRRT. Lactic acid continues increased now at 15, Ph 7.14, multiple blood products given; 2 Prbc's, 2 FFP and 1 cryo. Continue to have bleeding from both mouth and nose.  Family has been updated and are at  now.    Plan:  One bag of cryo to be given; continue w/goals of care.    Problem: Adjustment to Illness (Gastrointestinal Bleeding)  Goal: Optimal Coping with Acute Illness  Outcome: Declining     Problem: Bleeding (Gastrointestinal Bleeding)  Goal: Hemostasis  7/21/2020 1841 by Nichol Tejada RN  Outcome: Declining  7/21/2020 0655 by Dario Nelson, RN  Outcome: No Change  Intervention: Manage Gastrointestinal Bleeding  7/21/2020 0655 by Dario Nelson, RN  Flowsheets (Taken 7/21/2020 0400)  Bleeding Management:   blood products administered   dressing monitored   packing maintained  Stabilization Measures: blood products administered  Environmental Support: calm environment promoted     Problem: Bleeding (Gastrointestinal Bleeding)  Goal: Hemostasis  Intervention: Manage Gastrointestinal Bleeding  7/21/2020 0655 by Dario Nelson, RN  Flowsheets (Taken 7/21/2020 0400)  Bleeding Management:   blood products administered   dressing monitored   packing maintained  Stabilization Measures: blood products administered  Environmental Support: calm environment promoted     Problem: Gastrointestinal Condition Comorbidity  Intervention: Manage Acute Pain  7/21/2020 0655 by Dario Nelson, RN  Flowsheets (Taken 7/21/2020 0400)  Pain Management Interventions: medication (see MAR)     Problem: Gastrointestinal  Condition Comorbidity  Goal: Gastrointestinal Condition  Description: Patient comorbidity will be monitored for signs and symptoms of Gastrointestinal condition.  Problems will be absent, minimized or managed by discharge/transition of care.  7/21/2020 1841 by Nichol Tejada RN  Outcome: Declining

## 2020-07-21 NOTE — PLAN OF CARE
ICU End of Shift Summary. See flowsheets for vital signs and detailed assessment.    Changes this shift:   Intubated due to patient tiring out from work of breathing in compensating for metabolic acidosis.   Started on Fentanyl gtt for sedation and pain management.   Heart Rhythm into atrial fibrillation.   Hypotensive, started Levophed and titrating to MAPs > 65  Acetylcysteine gtt to step 3.   Change D10 to D20 to maintain BG > 70.  Changed Bicarb gtt to 1:1 concentration for volume control.   Right Triple lumen PICC placed.   Left femoral Art line placed.   Head/Chest/Abdomen/Pelvis CT imaging obtained.   Gave 1 unit of platelets for Platelet drop to 16.   Gave 2 units of FFP for elevated INR.   Gave 1 unit of Cryo for Low Fibrinogen.   Oligouric with decreasing urine output, urine icteric.   Oral and nasal bleeding worsening this afternoon, seen by ENT, oropharyngeal with excoriation from intubation and actively bleeding. Afrin soaked gauz packed in back of throat by ENT.          Plan:  Continue to give blood products, 2 PRBCs for Hgb of 5.4, and additional unit of Cryo for low fibrinogen. Monitor hematology labs closely. Manage bleeding from nares and mouth. ENT to see patient in the morning to assess rhinorocket in right nare and assess oropharyngeal excoriation. HD line placement planned for tomorrow for likely CRRT needs. Keep family updated as to status.

## 2020-07-21 NOTE — CONSULTS
GENERAL ID SERVICE CONSULTATION     Patient:  Ivanna Espinoza   Date of birth 1970, Medical record number [id number]  Date of Visit:  07/21/2020  Date of Admission: 7/19/2020  Consult Requester:Ghada Mir MD          Assessment and Recommendations:   ASSESSMENT:  1. Acute liver failure  2. Acute renal failure  3. Pancytopenia  4. DIC  5. Hemorrhagic shock  6. Fever    DISCUSSION:   Unfortunately, Ivanna Espinoza is critically ill with multi-organ failure of unknown etiology. ID was consulted for assistance with work up and diagnosis. Most of the extensive work up for infectious etiologies has already been sent and results are pending. His clinical picture is most consistent with a hemorrhagic fever; however, many causes of hemorrhagic fever are not endemic to this area. He has not been out of the country since late 2018 when he immigrated from [facility]. 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 Other considerations include Hantavirus, LCMV (lymphocytic choriomeningitis virus), or endemic fungi (blastomycosis and histoplasmosis). Many of the other infectious etiologies of liver failure, including the hepatitides, have results pending. We will continue to follow along to assist with work up.      RECOMMENDATION:  1. Send Hantavirus and LCMV testing.  2. Send Karius assay.  3. Send beta D glucan and galactomannan.   4. Start doxycycline to treat possible leptospirosis or tick borne illnesses.  5. Start Amphotericin B to empirically treat for endemic fungi.   6. Continue vancomycin and zosyn for empiric coverage of bacteria.     Thank you for this consult. ID will continue to follow.     Patient was discussed with Dr. Mckinney.     Rachael Cabrera MD  PGY-2, Internal Medicine-Pediatrics  Pager: [phone]    July 21,  2020  ________________________________________________________________    Consult Question: Work up of liver failure of unknown etiology.  Admission Diagnosis: Hepatitis [K75.9]  Gastrointestinal hemorrhage with hematemesis [K92.0]  Gastrointestinal hemorrhage with melena [K92.1]  Acute renal failure, unspecified acute renal failure type (H) [N17.9]         History of Present Illness:     Ivanna Espinoza is a 51yo M with no known medical history who presented on 7/19 with hematochezia, hematemesis, and dyspnea and was found to be in acute liver failure. He had recently been admitted to a hospital in [place], [place] for an unknown liver problem and was discharged a few days prior to arrival to this hospital. Unfortunately, Mr. Espinoza has decompensated rapidly since admission. He is currently intubated in the MICU and is on 4 pressors.     His liver injury is characterized by transaminitis (ALT ~2000, AST 3864-2549), Tbili 16-19, and alkaline phosphatase in the 600's. He also has pancytopenia and acute renal failure.     Per chart review, he and his family deny heavy EtOH use and substance use, herbal supplement use, and any workplace exposures. Mr. Espinoza is from [facility] and moved here in late 2018 to be with his sister after his brother-in-law was diagnosed with cancer. 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 We were unable to interview Mr. Espinoza because he was intubated and critically ill.     Thus far, his work up is remarkable for a normal acetaminophen level, normal salicylate level, negative Covid-19 NP swab, negative drug screen, negative JORGITO, low TSH and elevated fT4, ceruloplasmin normal, normal F Actin, negative screen for volatile alcohols, negative for ethylene glycol, low vitamin D,  (elevated).     ID work up:  Hepatitis A IgG reactive  Hepatitis A IgM nonreactive  Hepatitis B Core IgM nonreactive  Hepatitis B Core Ab reactive  Hepatitis B Surface Ab immune  Hepatitis B Surface Ag nonreactive    Hepatitis C Ab nonreactive  Hepatitis E IgG and IgM pending  Hepatitis D IgM pending  C Diff negative  Enteric pathogen panel negative  HSV 1&2 PCR negative  EBV PCR in process  EBV capsid IgG 4.3/positive  HIV Ag/Ab nonreactive  Treponema Abs nonreactive  CMV IgG 7.7/positive  CMV quant nonreactive  Coccidiodes Ab pending  Histoplasma Ag pending  Rickettsia rickettsii pending  Anaplasma IgG, IgM pending  E histolytica Ab pending  Strongyloides IgG pending  Quant gold pending  HBV/HCV/HIV DEVON pending  RVP negative  Schistosoma pending  Leptospira pending  Adenovirus PCR pending  Karius assay pending  Aspergillus galactomannan pending  Beta D glucan pending           Review of Systems:   CONSTITUTIONAL:  fevers  EYES: icterus  RESPIRATORY:  Positive for dyspnea   CARDIOVASCULAR:  negative for chest pain, dyspnea  GASTROINTESTINAL:  Positive for hematemesis and hematochezia  GENITOURINARY:  negative for dysuria  HEME:  Petechial rash   INTEGUMENT:  Petechial rash   NEURO:  Negative for headache         Past Medical History:   No past medical history on file.         Past Surgical History:     Past Surgical History:   Procedure Laterality Date     MIDLINE DOUBLE LUMEN PLACEMENT Right 07/20/2020    [facility]            Family History:   Reviewed and non-contributory.   No family history on file.         Social History:     Social History     Tobacco Use     Smoking status: Not on file   Substance Use Topics     Alcohol use: Not on file     History   Sexual Activity     Sexual activity: Not on file            Current Medications:       sodium chloride 0.9%  500 mL Intravenous Q24H     acetaminophen  650 mg Oral or Feeding Tube Q24H     D5W  10-20 mL Intravenous Q24H    And     amphotericin B liposome (AMBISOME) intermittent infusion  3 mg/kg (Dosing Weight) Intravenous Q24H    And     D5W  10-20 mL Intravenous Q24H     calcium chloride  1 g Intravenous Once     diphenhydrAMINE  25 mg Oral Q24H    Or     diphenhydrAMINE   25 mg Intravenous Q24H     doxycycline (VIBRAMYCIN) IV  100 mg Intravenous Q12H     heparin lock flush  5-15 mL Intracatheter Q24H     hydrocortisone sodium succinate PF  50 mg Intravenous Q6H     midazolam         oxymetazoline  3 spray Both Nostrils Q4H     pantoprazole (PROTONIX) IV  40 mg Intravenous BID     phytonadione  10 mg Intravenous Daily     piperacillin-tazobactam  4.5 g Intravenous Q6H     sodium chloride  2 spray Both Nostrils Q2H     sodium chloride (PF)  10 mL Intracatheter Q8H     vancomycin place mcdowell - receiving intermittent dosing  1 each Intravenous See Admin Instructions            Allergies:   No Known Allergies         Physical Exam:   Vitals were reviewed  Patient Vitals for the past 24 hrs:   BP Temp Temp src Pulse Heart Rate Resp SpO2 Weight   07/21/20 1748 -- -- -- -- 121 -- (!) 88 % --   07/21/20 1735 -- 96  F (35.6  C) Axillary -- 117 30 100 % --   07/21/20 1730 -- -- -- -- 116 -- 99 % --   07/21/20 1725 -- 96  F (35.6  C) Axillary -- 106 28 -- --   07/21/20 1600 -- 96.3  F (35.7  C) Axillary -- -- 28 -- --   07/21/20 1555 -- -- -- -- -- -- 100 % --   07/21/20 1500 -- -- -- -- -- 24 -- --   07/21/20 1400 -- -- -- -- 105 24 96 % --   07/21/20 1330 -- -- -- -- 112 -- 97 % --   07/21/20 1317 -- 97.4  F (36.3  C) Axillary -- 111 -- 98 % --   07/21/20 1300 -- -- -- -- 110 -- 96 % --   07/21/20 1230 -- -- -- -- 110 -- (!) 28 % --   07/21/20 1210 -- 96.2  F (35.7  C) Axillary -- -- -- -- --   07/21/20 1200 -- 96.3  F (35.7  C) Axillary -- 109 26 92 % --   07/21/20 1130 -- -- -- -- 112 -- 92 % --   07/21/20 1107 -- 96.9  F (36.1  C) Axillary -- 112 -- (!) 85 % --   07/21/20 1100 -- -- -- -- 111 25 (!) 78 % --   07/21/20 1000 -- 98.4  F (36.9  C) Axillary -- 113 25 100 % --   07/21/20 0950 -- 98.2  F (36.8  C) Axillary -- 114 -- 99 % --   07/21/20 0912 -- 99.5  F (37.5  C) Axillary -- -- -- -- --   07/21/20 0840 -- 98.7  F (37.1  C) Axillary -- 121 -- (!) 64 % --   07/21/20 0830 -- 99.7  F  (37.6  C) Axillary -- 121 -- 94 % --   07/21/20 0817 -- 99.7  F (37.6  C) Axillary -- 126 -- (!) 80 % --   07/21/20 0800 -- -- -- -- -- 25 -- --   07/21/20 0755 -- 99.6  F (37.6  C) Axillary -- 120 -- 94 % --   07/21/20 0745 -- 99.2  F (37.3  C) Axillary -- 129 -- (!) 78 % --   07/21/20 0700 -- 100.5  F (38.1  C) Axillary -- 122 -- (!) 65 % --   07/21/20 0645 -- -- -- -- 118 -- (!) 82 % --   07/21/20 0630 -- -- -- -- 117 -- (!) 83 % --   07/21/20 0615 -- -- -- -- 115 -- 93 % --   07/21/20 0613 -- -- -- -- 116 -- 96 % --   07/21/20 0600 -- 100.7  F (38.2  C) Axillary -- 114 26 (!) 83 % --   07/21/20 0553 -- -- -- -- 113 -- (!) 77 % --   07/21/20 0545 -- -- -- -- 113 -- (!) 78 % --   07/21/20 0540 -- 101.2  F (38.4  C) Axillary -- 112 -- (!) 55 % --   07/21/20 0530 -- 101.4  F (38.6  C) Axillary -- 113 -- (!) 53 % --   07/21/20 0526 -- -- -- -- 114 -- -- --   07/21/20 0515 -- -- -- -- 117 -- -- 57.4 kg (126 lb 8.7 oz)   07/21/20 0500 -- -- -- -- 117 27 93 % --   07/21/20 0445 -- -- -- -- 117 -- 99 % --   07/21/20 0430 -- -- -- -- 118 -- 92 % --   07/21/20 0421 -- -- -- -- 118 -- -- --   07/21/20 0415 -- -- -- -- 117 -- 100 % --   07/21/20 0400 -- -- -- -- 116 30 (!) 81 % --   07/21/20 0345 -- -- -- -- 117 -- (!) 85 % --   07/21/20 0330 -- 102  F (38.9  C) Axillary -- 120 -- (!) 88 % --   07/21/20 0315 -- -- -- -- 121 -- (!) 76 % --   07/21/20 0300 -- 102.6  F (39.2  C) Axillary -- 121 27 93 % --   07/21/20 0250 -- -- -- -- 121 -- 92 % --   07/21/20 0245 -- -- -- -- 120 -- (!) 85 % --   07/21/20 0230 -- -- -- -- 121 -- 93 % --   07/21/20 0225 -- 102.6  F (39.2  C) Axillary -- 121 -- (!) 85 % --   07/21/20 0215 -- 102.8  F (39.3  C) Axillary -- 123 -- (!) 79 % --   07/21/20 0200 -- -- -- -- 122 28 (!) 89 % --   07/21/20 0145 -- -- -- -- 123 -- 95 % --   07/21/20 0140 -- 101.9  F (38.8  C) Axillary -- 123 -- 96 % --   07/21/20 0130 -- -- -- -- 123 -- 96 % --   07/21/20 0115 -- -- -- -- 123 -- 92 % --   07/21/20 0100 --  102.4  F (39.1  C) Axillary -- 125 26 96 % --   07/21/20 0050 -- -- -- -- 125 -- 96 % --   07/21/20 0045 -- -- -- -- 124 -- 98 % --   07/21/20 0030 -- -- -- -- 124 -- 97 % --   07/21/20 0015 -- -- -- -- 124 -- 97 % --   07/21/20 0000 -- 102.1  F (38.9  C) -- -- 126 (!) 31 97 % --   07/20/20 2345 -- 102  F (38.9  C) Axillary -- 124 -- 96 % --   07/20/20 2330 -- -- -- -- 123 -- 93 % --   07/20/20 2323 -- 101.8  F (38.8  C) Axillary -- 125 -- (!) 77 % --   07/20/20 2319 -- 101.7  F (38.7  C) Axillary -- 124 -- (!) 81 % --   07/20/20 2315 -- -- -- -- 125 -- (!) 79 % --   07/20/20 2310 -- 101  F (38.3  C) Axillary -- -- -- -- --   07/20/20 2300 -- -- -- -- 121 27 (!) 77 % --   07/20/20 2248 -- -- -- -- 117 -- -- --   07/20/20 2245 -- -- -- -- 117 -- -- --   07/20/20 2230 -- -- -- -- 116 -- -- --   07/20/20 2215 -- -- -- -- 117 -- -- --   07/20/20 2200 -- -- -- -- 115 26 -- --   07/20/20 2145 -- -- -- -- 108 -- -- --   07/20/20 2130 -- -- -- -- 113 -- -- --   07/20/20 2127 -- -- -- -- 112 -- -- --   07/20/20 2120 -- -- -- -- 111 -- -- --   07/20/20 2115 -- -- -- -- 111 -- -- --   07/20/20 2100 -- -- -- -- 110 (!) 32 -- --   07/20/20 2045 -- -- -- -- 107 -- -- --   07/20/20 2035 -- 100  F (37.8  C) Axillary -- 110 -- -- --   07/20/20 2030 -- -- -- -- 107 -- (!) 86 % --   07/20/20 2025 -- 100.3  F (37.9  C) -- -- 107 -- -- --   07/20/20 2023 -- -- -- -- 108 -- -- --   07/20/20 2015 -- 99.4  F (37.4  C) Axillary -- 107 24 -- --   07/20/20 2005 -- -- -- -- 107 -- -- --   07/20/20 2000 98/47 99.4  F (37.4  C) Axillary 107 107 29 -- --   07/20/20 1945 -- -- -- -- 107 -- -- --   07/20/20 1930 -- 98.5  F (36.9  C) Axillary -- 106 (!) 34 -- --   07/20/20 1915 -- -- -- -- 103 -- -- --   07/20/20 1900 -- -- -- -- 115 -- -- --   07/20/20 1855 -- 97.8  F (36.6  C) Axillary -- 123 -- -- --   07/20/20 1845 -- -- -- -- 123 -- -- --   07/20/20 1830 -- 97.8  F (36.6  C) Axillary -- 120 -- -- --   07/20/20 1815 -- -- -- -- 111 -- 91 % --        Physical Examination:  GENERAL:  Intubated, lying in bed, critically ill  HEENT:  Head is normocephalic, atraumatic; blood oozing from mouth and nose  EYES:  Eyes closed, did not examine    ENT:  ETT in place, blood oozing from mouth  LUNGS:  Intubated, did not auscultate  CARDIOVASCULAR:  On 4 pressors, did not auscultate  ABDOMEN: Non-distended  SKIN:  Jaundiced; petechial rash on chest, arms, and legs  NEUROLOGIC:  sedated         Laboratory Data:     Hematology Studies    Recent Labs   Lab Test 07/21/20  1523 07/21/20  1001 07/21/20  0810 07/21/20  0400 07/20/20  2356 07/20/20  2120 07/20/20  1722 07/20/20  0952 07/20/20  0345  07/19/20  2233   WBC 0.6* 0.8* 0.9* 1.1* 1.3* 1.3* 1.2* 1.7* 2.1*  --  2.5*   ANEU  --   --   --  0.6*  --   --  0.7* 1.0* 1.1*  --  1.6   AEOS  --   --   --  0.1  --   --  0.0 0.1 0.1  --  0.1   HGB 9.1* 5.7* 6.3* 7.0* 6.1* 5.2* 5.4* 7.0* 7.7*   < > 8.2*   MCV 89 86 84 83 83 81 81 80 81  --  81   PLT 46* 72* 140* 38* 47* 46* 56* 16* 57*  --  67*    < > = values in this interval not displayed.       Metabolic Studies     Recent Labs   Lab Test 07/21/20  1516 07/21/20  1001 07/21/20  0400 07/20/20  2356 07/20/20  2052    143 141 138 137   POTASSIUM 4.0 4.6 5.2 5.6* 5.5*   CHLORIDE 100 104 101 102 100   CO2 14* 18* 19* 21 19*   BUN 76* 90* 98* 94* 94*   CR 5.33* 6.76* 7.01* 6.63* 6.62*   GFRESTIMATED 12* 9* 8* 9* 9*       Hepatic Studies    Recent Labs   Lab Test 07/21/20  0400 07/20/20  0952 07/20/20  0645 07/20/20  0345 07/19/20  2233   BILITOTAL 16.3* 17.6* 17.3* 18.0* 19.7*   ALKPHOS 607* 536*  --  608* 653*   ALBUMIN 1.4* 1.2*  --  1.4* 1.4*   AST 9,141* 5,392*  --  6,002* 6,396*   ALT 2,553* 1,794*  --  1,860* 1,969*       Microbiology:  Culture Micro   Date Value Ref Range Status   07/20/2020 No growth after 14 hours  Preliminary   07/20/2020 No growth after 14 hours  Preliminary   07/20/2020 No growth after 14 hours  Preliminary   07/20/2020 No growth after 16 hours   Preliminary   07/20/2020 No growth  Final   07/20/2020 No growth after 1 day  Preliminary   07/19/2020 No growth after 1 day  Preliminary       Urine Studies    Recent Labs   Lab Test 07/20/20  0526   LEUKEST Trace*   WBCU 10*       Vancomycin Levels    Recent Labs   Lab Test 07/21/20  1523   VANCOMYCIN 14.6       Hepatitis B Testing   Recent Labs   Lab Test 07/20/20  0345   HBCAB Reactive*   HEPBANG Nonreactive   HBCM Nonreactive     Hepatitis C Testing     Hepatitis C Antibody   Date Value Ref Range Status   07/20/2020 Nonreactive NR^Nonreactive Final     Comment:     Assay performance characteristics have not been established for newborns,   infants, and children       CT C/A/P (7/20):  1. Groundglass and consolidative opacities with moderate bilateral  pleural effusions, most suggestive of pulmonary edema. Infection is  not excluded.  2. Diffuse bowel wall edema and gallbladder wall edema suggestive of  fluid overload/hypoalbuminemia in a patient with acute hepatic and  renal failure.  3. Cardiomegaly.  4. Appropriate positioning of supportive devices as detailed above.    CT Head (7/20):  No acute pathology.

## 2020-07-21 NOTE — PROGRESS NOTES
Nephrology Progress Note  07/21/2020         Assessment & Recommendations:   Ivanna Espinoza is a 50 year old year old male who presented with GI bleed and found to have acute liver and renal failure. Febrile on 7/20 attributed to possible transfusion reaction. Developed worsening shock now on 3 pressors and remains anuric. CRRT initiated 7/21.    Anuric AZAM  Etiology possibly pre-renal in the setting of presentation with GI bleed although patient not noted to be significantly hypotensive since arrival but could have had hemodynamic instability prior. Cr improved with resuscitation which also supports likely pre-renal etiology. Urinalysis probably represents ATN with cellular debris and proteinuria. US without hydronephrosis.    Hyponatremia, with concern for rapid correction  Patient presented with Na of 128. This is now corrected to 143, primarily driven by bicarbonate replacement and concern for rapid correction. Given 1L D5W bolus for calculated free water deficit for goal Na 139 or below and will decrease post-filter NaBicarb to 100 meq which will be hypotonic and further replace free water.    Hyperphosphatemia  Hypocalcemia  Unclear etiology, would expect hypercalcemia with acidosis rather than low Ca. Lipase is also normal ruling out pancreatitis as a cause. Hyperphosphatemia likely contributing and frequent blood transfusions also playing a role. Will treat hyperphosphatemia with non-calcium based phosphate binder.    High anion gap mixed metabolic and respiratory acidosis  Improved after intubation and CRRT initiation. Etiology of acidosis likely in the setting of AZAM. Lactate continues to be elevated.    Pancytopenia with significantly elevated ferritin  Unclear is this can be explained from acute liver injury and would recommend heme/onc consultation for further elevation.    RECOMMENDATIONS:  - Continue CRRT  - Continue to monitor BMP and blood gases q6-8h  - Changed post-filter bicarb to 100 meq/hr,  "this is hypotonic and will contribute towards reducing free water deficit  - Continue to replace calcium prn  - Agree with albumin challenge  - Appreciate heme/onc and GI, involvement  - Please start sevelamer 800 TID  - Given patient's overall prognosis, strongly recommend palliative consult and ongoing discussions regarding goals of care  - Avoid nephrotoxins  - Renally dose medications  - Renal diet  - Daily weights  - Strict I/Os    Recommendations were communicated to primary team verbally and via note    Seen and discussed with Dr. Zana Chatman MD   Renal Fellow  [phone]    Interval History :   Nursing and provider notes from last 24 hours reviewed.  In the last 24 hours Ivanna Espinoza remains intubated with continued acidosis and worsening hyperkalemia overnight. Remains anuric. Initiated CRRT.    Review of Systems:  Unable to review with patient as intubated    Physical Exam:  I/O last 3 completed shifts:  In: 7800.37 [I.V.:6361.37]  Out: 65 [Urine:65]  BP 98/47   Pulse 107   Temp 96.2  F (35.7  C) (Axillary)   Resp 26   Ht 1.52 m (4' 11.84\")   Wt 57.4 kg (126 lb 8.7 oz)   SpO2 92%   BMI 24.84 kg/m       GENERAL APPEARANCE: Sedated, intubated  EYES: scleral icterus present, pupils equal  Pulmonary: lungs clear to auscultation with equal breath sounds bilaterally  CV: regular rhythm, normal rate, no rub   - Edema bilateral 1-2+  GI: soft, nontender, normal bowel sounds  : mullins present  SKIN: no rash, warm, dry, icteric  NEURO: Sedated    Labs:   All labs reviewed by me  Electrolytes/Renal -   Recent Labs   Lab Test 07/21/20  1140 07/21/20  1001 07/21/20  0810 07/21/20  0400 07/20/20  2356 07/20/20  2052 07/20/20  1722   NA  --  143  --  141 138 137  --    POTASSIUM  --  4.6  --  5.2 5.6* 5.5*  --    CHLORIDE  --  104  --  101 102 100  --    CO2  --  18*  --  19* 21 19*  --    BUN  --  90*  --  98* 94* 94*  --    CR  --  6.76*  --  7.01* 6.63* 6.62*  --    GLC  --  101*  --  119* 58* " 58*  --    TAI  --  7.2*  --  6.4* 8.5 5.8*  --    MAG 2.5*  --  2.6* 2.5* 2.5* 2.5*  --    PHOS  --   --   --  12.7*  --  12.7* 12.1*       CBC -   Recent Labs   Lab Test 07/21/20  1001 07/21/20  0810 07/21/20  0400   WBC 0.8* 0.9* 1.1*   HGB 5.7* 6.3* 7.0*   PLT 72* 140* 38*       LFTs -   Recent Labs   Lab Test 07/21/20  0400 07/20/20  0952 07/20/20  0645 07/20/20  0345   ALKPHOS 607* 536*  --  608*   BILITOTAL 16.3* 17.6* 17.3* 18.0*   ALT 2,553* 1,794*  --  1,860*   AST 9,141* 5,392*  --  6,002*   PROTTOTAL 3.4* 3.5*  --  3.7*   ALBUMIN 1.4* 1.2*  --  1.4*     Iron Panel -   Recent Labs   Lab Test 07/20/20  0952 07/20/20  0645   IRON 172 168   IRONSAT 146* 189*   LIZA  --  91,297*     Imaging:    EXAMINATION: CT CHEST ABDOMEN PELVIS W/O CONTRAST, 7/20/2020 4:21 PM     TECHNIQUE:  Helical CT images from the thoracic inlet through the  symphysis pubis were obtained without IV contrast.      COMPARISON: Earlier same day chest x-ray, abdominal ultrasound  7/20/2020.     HISTORY: poor clinical condition; AZAM and acute liver injury w/  unknown PMHx     FINDINGS:  CHEST:  LUNGS: Endotracheal tube within the mid thoracic trachea cephalad to  the wai. Small amount of debris within the dependent trachea to the  level of the wai. The central airways are patent. Moderate-sized  bilateral pleural effusions. Groundglass and consolidative opacities  in a parahilar and dependent distribution.     MEDIASTINUM: Right upper extremity approach PICC with the tip near the  cavoatrial junction. The heart size is mildly enlarged. No pericardial  effusion. Their main pulmonary artery diameters are within normal  limits. Normal appearance and configuration to the great vessels off  the aortic arch. Multiple prominent mediastinal lymph nodes example 3  mm intrapulmonary lymph node. No suspicious hilar or axillary lymph  nodes. The thyroid gland is unremarkable.     ABDOMEN/PELVIS:  LIVER: Hepatomegaly. No definitive intrahepatic  mass.     BILIARY: Gallbladder wall edema with subtle hyperdensity near the  gallbladder neck consistent with biliary sludge.      PANCREAS: Within normal limits.     SPLEEN: Within normal limits.     ADRENAL GLANDS: Within normal limits.     URINARY TRACT: The kidneys are unremarkable. No hydronephrosis or  hydroureter. Urinary catheter with balloon inflated within the urinary  bladder.     REPRODUCTIVE ORGANS: Within normal limits.     STOMACH: Within normal limits.     BOWEL: Diffuse large and small bowel wall edema. No abnormally dilated  loops of large or small bowel. The appendix is unremarkable.     PERITONEUM/FLUID: No free fluid.     VESSELS: No aneurysmal dilatation of the abdominal aorta.     LYMPH NODES: Multiple prominent mesenteric lymph nodes, favored to be  reactive.     BONES/SOFT TISSUES: No aggressive osseous lesions. Mild degenerative  changes of the thoracic spine. Diffuse anasarca.                                                        IMPRESSION:   1. Groundglass and consolidative opacities with moderate bilateral pleural effusions, most suggestive of pulmonary edema. Infection is not excluded.  2. Diffuse bowel wall edema and gallbladder wall edema suggestive of fluid overload/hypoalbuminemia in a patient with acute hepatic and renal failure.  3. Cardiomegaly.  4. Appropriate positioning of supportive devices as detailed above.    Current Medications:    dextrose 5%  1,000 mL Intravenous Once     heparin lock flush  5-15 mL Intracatheter Q24H     milk thistle xtra  2 capsule Oral Q12H     pantoprazole (PROTONIX) IV  40 mg Intravenous BID     phytonadione  10 mg Intravenous Daily     piperacillin-tazobactam  4.5 g Intravenous Q6H     sodium chloride (PF)  10 mL Intracatheter Q8H     vancomycin place mcdowell - receiving intermittent dosing  1 each Intravenous See Admin Instructions       acetylcysteine (ACETADOTE) infusion *second dose* 2.52 g (07/20/20 0738)     acetylcysteine (ACETADOTE)  infusion *third dose - elevated AST* 6.25 mg/kg/hr (07/21/20 0803)     dextrose (non-standard conc) IV solution Stopped (07/21/20 0951)     CRRT replacement solution 12.5 mL/kg/hr (07/21/20 0910)     fentaNYL 100 mcg/hr (07/21/20 1200)     - MEDICATION INSTRUCTIONS -       norepinephrine 0.5 mcg/kg/min (07/21/20 1200)     phenylephrine 4.5 mcg/kg/min (07/21/20 1200)     sodium bicabonate in water for infusion 200 mL/hr at 07/21/20 0911     CRRT replacement solution 12.5 mL/kg/hr (07/21/20 0905)     vasopressin (PITRESSIN) infusion ADULT (40 mL) 4 Units/hr (07/21/20 1200)     Alyssa Chatman MD

## 2020-07-21 NOTE — PROGRESS NOTES
Notified by ICU team about worsening K and persistent acidosis despite being on 1:1 bicarb drip @ 35 cc/hr. Patient's Hgb remains ~5 and anticipate further transfusions which will likely drive K higher. Patient remains anuric. ICU team plans on placement of temporary HD catheter, appreciate their help very much, and will initiate CRRT with 2K bath in pre-filter and dialysate solutions and sodium bicarb 150 meq @ 200 ml/hr as post-filter. Due to bicarb replacement via post-filter, once CRRT is initiated the peripheral bicarb drip should be stopped, discussed this with ICU team and LINNEA Bishop.    Discussed with Dr Cullen.    Alyssa Chatman MD  Renal Fellow  [phone]

## 2020-07-22 NOTE — DEATH PRONOUNCEMENT
MD DEATH PRONOUNCEMENT    Called to pronounce Ivanna Espinoza dead.    Physical Exam: Unresponsive to noxious stimuli, Spontaneous respirations absent, Breath sounds absent, Carotid pulse absent and Heart sounds absent    Patient was pronounced dead at 04:44 AM, 2020.    Preliminary Cause of Death: Acute liver failure of unknown etiology with multi-organ failure/shock    Active Problems:    Acute liver failure   Acute renal Failure  Shock, undifferentiated    Infectious disease present?: NO    Communicable disease present? (examples: HIV, chicken pox, TB, Ebola, CJD) :  NO    Multi-drug resistant organism present? (example: MRSA): NO    Please consider an autopsy if any of the following exist:  NO Unexpected or unexplained death during or following any dental, medical, or surgical diagnostic treatment procedures.   NO Death of mother at or up to seven days after delivery.     NO All  and pediatric deaths.     NO Death where the cause is sufficiently obscure to delay completion of the death certificate.   NO Deaths in which autopsy would confirm a suspected illness/condition that would affect surviving family members or recipients of transplanted organs.     The following deaths must be reported to the 's Office:  NO A death that may be due entirely or in part to any factors other than natural disease (recent surgery, recent trauma, suspected abuse/neglect).   NO A death that may be an accident, suicide, or homicide.     NO Any sudden, unexpected death in which there is no prior history of significant heart disease or any other condition associated with sudden death.   NO A death under suspicious, unusual, or unexpected circumstances.    NO Any death which is apparently due to natural causes but in which the  does not have a personal physician familiar with the patient s medical history, social, or environmental situation or the circumstances of the terminal event.   NO Any death  apparently due to Sudden Infant Death Syndrome.     NO Deaths that occur during, in association with, or as consequences of a diagnostic, therapeutic, or anesthetic procedure.   NO Any death in which a fracture of a major bone has occurred within the past (6) six months.   NO A death of persons note seen by their physician within 120 days of demise.     NO Any death in which the  was an inmate of a public institution or was in the custody of Law Enforcement personnel.   NO  All unexpected deaths of children   NO Solid organ donors   NO Unidentified bodies   NO Deaths of persons whose bodies are to be cremated or otherwise disposed of so that the bodies will later be unavailable for examination;   NO Deaths unattended by a physician outside of a licensed healthcare facility or licensed residential hospice program   NO Deaths occurring within 24 hours of arrival to a health care facility if death is unexpected.    NO Deaths associated with the decedent s employment.   NO Deaths attributed to acts of terrorism.   NO Any death in which there is uncertainty as to whether it is a medical examiner s care should be discussed with the medical investigator.        Body disposition: Autopsy was discussed with family member:  Sister by phone.  Permission for autopsy was declined. I tried to explain that we do NOT know the cause of death, however, sister refused d/t Jehovah's witness/cultural reasons.

## 2020-07-22 NOTE — PROGRESS NOTES
Pt  at 444. Was DNR with all other full support still in use at maximum doses and rates. MD on call notified and pronounced death. Patient's lines removed, patient was bathed, and dressed in a suit per sister's request. Sister Lexis was called and informed that the patient was ready for another family member to visit.

## 2020-07-22 NOTE — PHARMACY-VANCOMYCIN DOSING SERVICE
Pharmacy Vancomycin Note  Date of Service 2020  Patient's  1970   50 year old, male    Indication: Sepsis  Goal Trough Level: 15-20 mg/L  Day of Therapy: 2  Current Vancomycin regimen:  Intermittent dosing, last recieved 1250mg on     Current estimated CrCl = Estimated Creatinine Clearance: 13.8 mL/min (A) (based on SCr of 5.33 mg/dL (H)).    Creatinine for last 3 days  2020: 10:33 PM Creatinine 6.80 mg/dL  2020:  2:02 AM Creatinine 7.01 mg/dL;  3:45 AM Creatinine 6.42 mg/dL;  9:52 AM Creatinine 6.05 mg/dL;  2:29 PM Creatinine 6.27 mg/dL;  8:52 PM Creatinine 6.62 mg/dL; 11:56 PM Creatinine 6.63 mg/dL  2020:  4:00 AM Creatinine 7.01 mg/dL; 10:01 AM Creatinine 6.76 mg/dL;  3:16 PM Creatinine 5.33 mg/dL    Recent Vancomycin Levels (past 3 days)  2020:  3:23 PM Vancomycin Level 14.6 mg/L (~21 hours, prior to starting CRRT    Vancomycin IV Administrations (past 72 hours)                   vancomycin 1250 mg in 0.9% NaCl 250 mL intermittent infusion 1,250 mg (mg) 1,250 mg Given 20 1824                Nephrotoxins and other renal medications (From now, onward)    Start     Dose/Rate Route Frequency Ordered Stop    20 1930  vancomycin (VANCOCIN) 1000 mg in dextrose 5% 200 mL PREMIX      1,000 mg  200 mL/hr over 1 Hours Intravenous EVERY 24 HOURS 20 1912      20 1700  amphotericin B LIPOSOME (AMBISOME) 150 mg in D5W 137.5 mL intermittent infusion     Note to Pharmacy:  Dextrose ORDER DOSE FIELD and DISPENSE FIELD need to filled in.     Open Dextrose ingredient and fill in both fields. Dextrose order dose is needed for total volume and rate calculation.     3 mg/kg × 50.4 kg (Dosing Weight)  68.8 mL/hr over 120 Minutes Intravenous EVERY 24 HOURS 20 1544      20 1400  piperacillin-tazobactam (ZOSYN) 4.5 g vial to attach to  mL bag      4.5 g  over 30 Minutes Intravenous EVERY 6 HOURS 20 0945      20 1000  phenylephrine  (RUDY-SYNEPHRINE) 200 mg in sodium chloride 0.9 % 250 mL infusion      0.5-6 mcg/kg/min × 50.4 kg (Dosing Weight)  1.9-22.7 mL/hr  Intravenous CONTINUOUS 07/21/20 0902 07/20/20 2130  vasopressin (VASOSTRICT) 40 Units in sodium chloride 0.9 % 40 mL infusion      0.5-4 Units/hr  0.5-4 mL/hr  Intravenous CONTINUOUS 07/20/20 2127 07/20/20 1528  vancomycin place mcdowell - receiving intermittent dosing      1 each Intravenous SEE ADMIN INSTRUCTIONS 07/20/20 1528      07/20/20 1215  norepinephrine (LEVOPHED) 16 mg in  mL infusion      0.03-1 mcg/kg/min × 50.4 kg (Dosing Weight)  1.4-47.3 mL/hr  Intravenous CONTINUOUS 07/20/20 1211               Contrast Orders - past 72 hours (72h ago, onward)    None          Interpretation of levels and current regimen:  Trough level is  Therapeutic but only on intermittent dosing,  Not at steady state    Has serum creatinine changed > 50% in last 72 hours: Yes    Urine output:  diminished urine output    Renal Function: ARF on Dialysis-CRRT    Plan:  1.  Will schedule vancomycin 1000mg IV q24h with start of CRRT (20mg/kg)  2.  Pharmacy will check trough levels as appropriate in 1-3 Days.    3. Serum creatinine levels will be ordered daily for the first week of therapy and at least twice weekly for subsequent weeks.      Nelia Cedillo formerly [facility]        .

## 2020-07-23 LAB
1,25(OH)2D SERPL-MCNC: <5 PG/ML (ref 19.9–79.3)
1,3 BETA GLUCAN SER-MCNC: 267 PG/ML
A PHAGOCYTOPH IGG TITR SER IF: NORMAL {TITER}
A PHAGOCYTOPH IGM TITR SER IF: NORMAL {TITER}
AFP L3 MFR SERPL: NORMAL % (ref 0–9.9)
AFP SERPL-MCNC: NORMAL NG/ML (ref 0–15)
ASPERGILLUS GALACTOMANNAN ANTIGEN BAL: POSITIVE
B-D GLUCAN INTERPRETATION (1,3): POSITIVE
COCCIDIOIDES AB SPEC QL ID: NORMAL
E HISTOLYT IGG SER IA-ACNC: 1 U
GALACTOMANNAN AG SERPL QL IA: NEGATIVE
GALACTOMANNAN AG SERPL-ACNC: 0.19
GALACTOMANNAN AG SERPL-ACNC: 4.45
HEV IGG SER QL: NEGATIVE
HEV IGM SER QL: NEGATIVE
LAB SCANNED RESULT: NORMAL
LEPTOSPIRA IGM SER QL: NEGATIVE
R RICKETTSI IGG TITR SER IF: NORMAL {TITER}
R RICKETTSI IGM TITR SER IF: NORMAL {TITER}
STRONGYLOIDES IGG SER IA-ACNC: 0.4 IV

## 2020-07-23 NOTE — DISCHARGE SUMMARY
[facility], [place]    Death Summary - Medicine & Pediatrics    Date of Admission:  7/19/2020  Date of Death: 7/22/2020 10:37 AM  Attending Provider: Mychal Greer MD  Service: MICU    Discharge Diagnoses   Acute liver failure  Acute renal failure  Neutropenia  Acute anemia  Thrombocytopenia  Disseminated intravascular coagulation    Cause of death: Multiorgan failure in setting of acute liver and renal failure of unknown etiology    Hospital Course   Ivanna Espinoza was admitted on 7/19/2020 for dyspnea, hematochezia and hematemesis, found to have acute liver injury and acute renal failure and was admitted to MICU for such.  He was intubated for acute hypoxic respiratory failure and inability to protect his airway.  His acute liver injury quickly progressed to fulminant liver failure and his renal failure required initiation of CRRT.  He developed multifactorial shock that required 4 pressors.  The cause of patient's liver and renal failure was not identified before he passed on 7/22/20.  The following problems were addressed during his hospitalization:    Acute liver failure  The etiology of patient's liver failure was unknown.  Extensive workup before patient's passing was unrevealing.  Hepatology and Transplant Surgery were consulted.  Patient had previously been seen at an OSH in [facility] a week before admission, where alcoholic liver injury was suspected, however, the evidence and collateral information from patient's family (sisters) did not support this.  Suspect ingested toxin vs fugal or bacterial infection (histo/blasto/leptospirosis).  Patient was not a liver transplant candidate because of both the severity of his multi-organ failure and his undocumented immigrant status.      Acute renal failure  Renal failure likely secondary to acute liver failure and multifactorial shock.  Patient was started on CRRT on 7/21/20 for acidosis and hyperkalemia.      Hypoxemic  respiratory failure  Likely in setting of volume overload due to acute renal failure.  Inability to protect airway also contributed to decision to intubate.  Bronch and BAL performed while intubated showed budding yeast on gram stain but no other grown to explain patient's rapid deterioration and multi-organ failure.     Shock  Likely multifactorial, distributive and septic in nature.  Patient eventually required four pressors and initiation of stress dose steroids before passing.     Anion gap metabolic acidosis  In setting of shock and liver failure.     Nasal/oropharyngeal bleeding  In setting of significant coagulopathy.    Pancytopenia  Patient's cell lines had been decreased at OSH.  Unclear what was driving this pancytopenia.  He became neutropenic during MICU admission.  Acute anemia required transfusion of multiple units of PRBCs.      Coagulopathy  Worsened during admission.  Consistent with DIC at time of death.     Elevated ferritin  Given extreme elevation to >95,000, there was concern for HLH and additional markers were sent. Hematology was consulted and was not highly suspicious of HLH.     Diffuse petechial rash  Suspect this was secondary to DIC and thrombocytopenia, although could have been 2/2 infection.     Won Mishra,   [facility], [place]    ______________________________________________________________________      Significant Results and Procedures   Most Recent 3 CBC's:  Recent Labs   Lab Test 07/22/20  0344 07/21/20 2156 07/21/20  1523   WBC 0.5* 0.6* 0.6*   HGB 7.9* 8.7* 9.1*   MCV 91 90 89   PLT 29* 35* 46*     Most Recent 3 BMP's:  Recent Labs   Lab Test 07/22/20  0344 07/21/20  2156 07/21/20  1516    139 137   POTASSIUM 4.8 4.6 4.0   CHLORIDE 98 100 100   CO2 11* 13* 14*   BUN 51* 61* 76*   CR 3.77* 4.38* 5.33*   ANIONGAP 34* 26* 24*   TAI 7.0* 7.2* 7.8*   GLC 99 149* 202*     Most Recent 2 LFT's:  Recent Labs   Lab Test 07/22/20 0344  07/21/20  0400   AST 9,606* 9,141*   ALT 2,410* 2,553*   ALKPHOS 564* 607*   BILITOTAL 11.0* 16.3*     Most Recent 3 INR's:  Recent Labs   Lab Test 07/22/20  0344 07/21/20  2156 07/21/20  1523   INR 6.35* 5.32* 5.96*     Most Recent 3 Troponin's:  Recent Labs   Lab Test 07/19/20  2233   TROPI 0.025     Most Recent 3 BNP's:No lab results found.  Most Recent D-dimer:No lab results found.  Most Recent 6 Bacteria Isolates From Any Culture (See EPIC Reports for Culture Details):  Recent Labs   Lab Test 07/21/20  1643 07/20/20  2204 07/20/20  2155 07/20/20  1722 07/20/20  0526 07/20/20  0132   CULT Yeast isolated*  Moderate growth  Yeast  *  Light growth  Filamentous fungus isolated  Referred to mycology for identification  *  No growth after 14 hours  Culture negative monitoring continues No growth after 3 days No growth after 3 days  No growth after 3 days No growth after 3 days No growth No growth after 3 days     Most Recent TSH and T4:  Recent Labs   Lab Test 07/20/20  0952   TSH 0.10*  0.10*   T4 1.76*     Most Recent Hemoglobin A1c:No lab results found.  Most Recent Urinalysis:  Recent Labs   Lab Test 07/20/20  0526   COLOR Dark Brown   APPEARANCE Slightly Cloudy   URINEGLC Negative   URINEBILI Large*   URINEKETONE Negative   SG 1.014   UBLD Large*   URINEPH 5.5   PROTEIN 100*   NITRITE Negative   LEUKEST Trace*   RBCU 82*   WBCU 10*     Most Recent ABG:  Recent Labs   Lab Test 07/22/20  0344   PH 7.14*   PO2 53*   PCO2 33*   HCO3 11*     Most Recent ESR & CRP:No lab results found.  Most Recent CPK:  Recent Labs   Lab Test 07/21/20  0400 07/19/20  2233   CKT 1,674* 716*   ,   Results for orders placed or performed during the hospital encounter of 07/19/20   XR Chest Port 1 View    Narrative    EXAM: XR CHEST PORT 1 VW  LOCATION: [facility]  DATE/TIME: 7/19/2020 11:44 PM    INDICATION: ; shortness of breath;  COMPARISON: None.      Impression    IMPRESSION: Mild atelectasis at the left base  medially. No evidence for CHF or pneumonia. No pleural effusion or pneumothorax.  Normal heart size.   US Abdomen Limited    Narrative    EXAMINATION: US ABDOMEN LIMITED, 7/20/2020 2:15 AM     COMPARISON: None available    HISTORY: Hepatitis, GI bleed.    TECHNIQUE: The abdomen was scanned in standard fashion with  specialized ultrasound transducer(s) using both grey scale and limited  color Doppler techniques.    Findings:  Liver: The liver demonstrates normal homogeneous echotexture. The  liver measures 15.4 cm.. No evidence of a focal hepatic mass or  intrahepatic biliary ductal dilatation. The main portal vein is patent  with antegrade flow.    Gallbladder: The gallbladder is well distended and of normal  morphology. Small amount of layering biliary sludge. There is no wall  thickening, pericholecystic fluid. Negative sonographic Nelson's sign.    Bile Ducts: Both the intra- and extrahepatic biliary system are of  normal caliber.  The common bile duct measures 6.8 mm in diameter.    Pancreas: Visualized portions of the head and body of the pancreas are  unremarkable.     Right kidney: Demonstrates normal echotexture without focal mass. No  hydronephrosis or nephrolithiasis. The craniocaudal dimensions 10.7  cm.    Lung bases: Right pleural effusion.      Impression    Impression:   1. Small amount of gallbladder sludge. No sonographic evidence of  acute cholecystitis.  2. Right pleural effusion.    I have personally reviewed the examination and initial interpretation  and I agree with the findings.    DYLAN NEWSOME MD   US Abd/Pelvis Duplex Complete Portable    Narrative    Exam: Complete abdominal ultrasound with Doppler , 7/20/2020.    History: Acute liver failure    Comparison: The same day abdominal ultrasound.    Findings: See same day abdominal ultrasound for grayscale evaluation.  Findings obscured by motion artifact with rapid respiration rate as  per technologist.    Doppler:   Flow in the portal venous  system is towards the liver:  27 cm/sec in the main portal vein   27 cm/sec in the right portal vein  16 cm/sec in the left portal vein.    Flow in the hepatic artery is towards the liver:  81 cm/sec peak systolic  33 cm/sec minimum diastolic flow   0.59 resistive index.    The hepatic venous system is patent with flow towards the IVC.  The mid splenic vein is patent with flow towards the liver.  The IVC is patent with flow towards the heart.      Impression    Impression: Patent hepatic vasculature doppler evaluation allowing for  increased respiration rate. See same day ultrasound for grayscale  evaluation of the abdomen.    I have personally reviewed the examination and initial interpretation  and I agree with the findings.    RUSS MOLINA MD   XR Chest Port 1 View    Narrative    EXAM: XR CHEST PORT 1 VW  7/20/2020 12:13 PM     HISTORY:  Verify PICC       COMPARISON:  Chest x-ray 7/19/2020    FINDINGS:   Right upper extremity PICC with tip terminating at the proximal right  atrium.    The trachea is midline. The cardiomediastinal silhouette is slightly  enlarged.. The pulmonary vasculature are prominent.     The included osseous thorax, soft tissues and upper abdomen and are  within normal limits.     New right-sided pleural effusion. Bibasilar mixed  interstitial/airspace opacities.       Impression    IMPRESSION:  1. Right upper extremity PICC with tip terminating in the proximal  right atrium.  2. New right pleural effusion with mixed opacities suggestive of  pulmonary edema. Recommend follow-up to clearing to exclude infection.    I have personally reviewed the examination and initial interpretation  and I agree with the findings.    RIOS SCHWARTZ MD   XR Chest Port 1 View     Value    Radiologist flags Endotracheal tube malpositioning (Urgent)    Narrative    Exam: XR CHEST PORT 1 VW, 7/20/2020 3:06 PM    Indication: Verify PICC    Comparison: Chest x-ray same day    Findings:   Interval  intubation. ET tube is at the level of the wai,  approaching the right mainstem bronchus. Right upper extremity PICC  line overlies the cavoatrial junction.    Trachea is midline. Normal cardiac size. No pneumothorax. Bilateral  pleural effusions. Relatively unchanged bilateral hazy interstitial  opacities. No acute osseous or soft tissue abnormalities. No acute  upper abdominal pathology.      Impression    Impression:   1. Interval intubation with ET tube just proximal to the right  mainstem bronchus. Recommend retraction.  2. No relative change in bilateral pulmonary edema  3. Right upper extremity PICC line overlies the cavoatrial junction    [Urgent Result: Endotracheal tube malpositioning]    Finding was identified on 7/20/2020 3:10 PM.     Dr Darío Coronado was contacted by Dr. Casey Gannon at 7/20/2020  3:31 PM and verbalized understanding of the urgent finding.      I have personally reviewed the examination and initial interpretation  and I agree with the findings.    TITUS SHEEHAN MD   CT Chest Abdomen Pelvis w/o Contrast    Narrative    EXAMINATION: CT CHEST ABDOMEN PELVIS W/O CONTRAST, 7/20/2020 4:21 PM    TECHNIQUE:  Helical CT images from the thoracic inlet through the  symphysis pubis were obtained without IV contrast.     COMPARISON: Earlier same day chest x-ray, abdominal ultrasound  7/20/2020.    HISTORY: poor clinical condition; AZAM and acute liver injury w/  unknown PMHx    FINDINGS:  CHEST:  LUNGS: Endotracheal tube within the mid thoracic trachea cephalad to  the wai. Small amount of debris within the dependent trachea to the  level of the wai. The central airways are patent. Moderate-sized  bilateral pleural effusions. Groundglass and consolidative opacities  in a parahilar and dependent distribution.    MEDIASTINUM: Right upper extremity approach PICC with the tip near the  cavoatrial junction. The heart size is mildly enlarged. No pericardial  effusion. Their main pulmonary  artery diameters are within normal  limits. Normal appearance and configuration to the great vessels off  the aortic arch. Multiple prominent mediastinal lymph nodes example 3  mm intrapulmonary lymph node. No suspicious hilar or axillary lymph  nodes. The thyroid gland is unremarkable.    ABDOMEN/PELVIS:  LIVER: Hepatomegaly. No definitive intrahepatic mass.    BILIARY: Gallbladder wall edema with subtle hyperdensity near the  gallbladder neck consistent with biliary sludge.     PANCREAS: Within normal limits.    SPLEEN: Within normal limits.    ADRENAL GLANDS: Within normal limits.    URINARY TRACT: The kidneys are unremarkable. No hydronephrosis or  hydroureter. Urinary catheter with balloon inflated within the urinary  bladder.    REPRODUCTIVE ORGANS: Within normal limits.    STOMACH: Within normal limits.    BOWEL: Diffuse large and small bowel wall edema. No abnormally dilated  loops of large or small bowel. The appendix is unremarkable.    PERITONEUM/FLUID: No free fluid.    VESSELS: No aneurysmal dilatation of the abdominal aorta.    LYMPH NODES: Multiple prominent mesenteric lymph nodes, favored to be  reactive.    BONES/SOFT TISSUES: No aggressive osseous lesions. Mild degenerative  changes of the thoracic spine. Diffuse anasarca.      Impression    IMPRESSION:   1. Groundglass and consolidative opacities with moderate bilateral  pleural effusions, most suggestive of pulmonary edema. Infection is  not excluded.  2. Diffuse bowel wall edema and gallbladder wall edema suggestive of  fluid overload/hypoalbuminemia in a patient with acute hepatic and  renal failure.  3. Cardiomegaly.  4. Appropriate positioning of supportive devices as detailed above.    I have personally reviewed the examination and initial interpretation  and I agree with the findings.    LUANA CATALAN MD   CT Head w/o Contrast    Narrative    CT HEAD W/O CONTRAST 7/20/2020 4:22 PM    History: poor clinical condition; AZAM and acute  liver injury w/  unknown PMHx   ICD-10:    Comparison: None    Technique: Using multidetector thin collimation helical acquisition  technique, axial, coronal and sagittal CT images from the skull base  to the vertex were obtained without intravenous contrast.    Findings: There is no intracranial hemorrhage, mass effect, or midline  shift. Gray/white matter differentiation in both cerebral hemispheres  is preserved. Ventricles are proportionate to the cerebral sulci. The  basal cisterns are clear.    The bony calvaria and the bones of the skull base are normal.  Scattered mucosal thickening seen in bilateral frontal, ethmoid,  sphenoid sinuses and the right maxillary sinus. Bilateral mastoid  effusions.       Impression    Impression:   No acute intracranial pathology.    I have personally reviewed the examination and initial interpretation  and I agree with the findings.    MARIE METCALF MD   XR Chest Port 1 View    Narrative    Exam: XR CHEST PORT 1 VW, 2020 1:50 AM    Indication: Interval changes given c/f ARDS    Comparison: CT chest abdomen pelvis 2020    Findings: Single portable chest radiograph. Endotracheal tube  approximately 4.7 cm above the wai. Right upper extremity PICC  terminating at the superior cavoatrial junction. Trachea is midline.  Heart size within normal limits. Mild interstitial markings.  Thickening of the right minor fissure with bilateral Kerley B lines.  Upper abdomen is unremarkable.      Impression    Impression:   1. Improved pulmonary edema.  2. Support devices in stable position.    I have personally reviewed the examination and initial interpretation  and I agree with the findings.    ACRLO OCHOA MD   Echo Complete    Narrative    [id number]  [id number]  [id number]  [id number]^NAVEEN^ADELAIDE^LAWRENCE           [facility] [place]  Echocardiography Laboratory  [address] [place] [id number]     Name: RASHAWN SPANN  MRN: [id number]  :  1970  Study Date: 07/20/2020 10:29 AM  Age: 50 yrs  Gender: Male  [place] [facility] [place]  Reason For Study: HELEN, CP  Ordering Physician: ADELAIDE HODGE  Performed By: Gustavo Dalal RDCS     BSA: 1.4 m2  Height: 59 in  Weight: 111 lb  HR: 107  BP: 113/82 mmHg  _____________________________________________________________________________  __        Procedure  Complete Portable Echo Adult.  _____________________________________________________________________________  __        Interpretation Summary  Severely dilated aortic root when indexed to body surface area, measuring 4.4  cm (3.1 cm/m2.) Recommend CTA/MRA of the aorta to better assess aortic  dimensions.  Left and right ventricular function, chamber size, wall motion, and wall  thickness are normal.The LVEF is 60-65%.  Mild aortic insufficiency is present.  A right pleural effusion is present.  Previous study not available for comparison.  _____________________________________________________________________________  __        Left Ventricle  Left ventricular function, chamber size, wall motion, and wall thickness are  normal.The EF is 60-65%. Diastolic function not assessed due to tachycardia.     Right Ventricle  Right ventricular function, chamber size, wall motion, and thickness are  normal.     Atria  Both atria appear normal.     Mitral Valve  The mitral valve is normal.        Aortic Valve  Mild aortic insufficiency is present.     Pulmonic Valve  The pulmonic valve is normal.     Vessels  The inferior vena cava cannot be assessed. Sinuses of Valsalva 4.4 cm (3.1  cm/m2)  Proximal ascending aorta 3.6 cm (2.6 cm/m2). Unable to assess mean RA pressure  given the patient is on a ventilator.     Pericardium  No pericardial effusion is present.     Miscellaneous  A right pleural effusion is present.        Compared to Previous Study  Previous study not available for  comparison.  _____________________________________________________________________________  __  MMode/2D Measurements & Calculations     IVSd: 1.1 cm  LVIDd: 4.1 cm  LVIDs: 2.6 cm  LVPWd: 0.81 cm  FS: 35.5 %  LV mass(C)d: 122.9 grams  LV mass(C)dI: 85.6 grams/m2  asc Aorta Diam: 3.6 cm  LVOT diam: 2.3 cm  LVOT area: 4.0 cm2  LA Volume Index (BP): 27.4 ml/m2  RWT: 0.40           Doppler Measurements & Calculations  PA acc time: 0.11 sec     _____________________________________________________________________________  __           Report approved by: Nury Rodarte 07/20/2020 11:31 AM            Consultations This Hospital Stay   PHARMACY TO DOSE PHYTONADIONE  GI HEPATOLOGY ADULT IP CONSULT  VASCULAR ACCESS CARE ADULT IP CONSULT  NEPHROLOGY ICU IP CONSULT  VASCULAR ACCESS FOR PICC PLACEMENT ADULT IP CONSULT  SOCIAL WORK IP CONSULT  NUTRITION SERVICES ADULT IP CONSULT  PHYSICAL THERAPY ADULT IP CONSULT  OCCUPATIONAL THERAPY ADULT IP CONSULT  CARDIOLOGY GENERAL ADULT IP CONSULT  TRANSPLANT SURGERY LIVER ADULT IP CONSULT  ENT IP CONSULT  HEMATOLOGY ADULT IP CONSULT  PHARMACY TO DOSE VANCO  PHARMACY CRRT IP CONSULT  INFECTIOUS DISEASE GENERAL ADULT IP CONSULT    Primary Care Physician   Physician No Ref-Primary

## 2020-07-24 LAB — PETH BLD-MCNC: NEGATIVE NG/ML

## 2020-07-25 LAB — HDV IGM SER IA-ACNC: NEGATIVE

## 2020-07-26 LAB
BACTERIA SPEC CULT: NO GROWTH
BACTERIA SPEC CULT: NORMAL
P JIROVECII DNA SPEC QL NAA+PROBE: NOT DETECTED
SPECIMEN SOURCE: NORMAL

## 2020-07-27 LAB — CRYOGLOB SER QL: ABNORMAL %

## 2020-07-28 LAB
BACTERIA SPEC CULT: ABNORMAL
BACTERIA SPEC CULT: ABNORMAL
SCHISTOSOMA IGG SER QL: NORMAL
SPECIMEN SOURCE: ABNORMAL

## 2020-07-29 LAB
RESULT: NORMAL
SEND OUTS MISC TEST CODE: NORMAL
SEND OUTS MISC TEST SPECIMEN: NORMAL
TEST NAME: NORMAL

## 2020-08-03 LAB
BACTERIA SPEC CULT: NORMAL
BACTERIA SPEC CULT: NORMAL
Lab: NORMAL
SPECIMEN SOURCE: NORMAL
YEAST SPEC QL CULT: NO GROWTH

## 2020-08-18 LAB
BACTERIA SPEC CULT: NORMAL
FUNGUS SPEC CULT: ABNORMAL
SPECIMEN SOURCE: ABNORMAL
SPECIMEN SOURCE: NORMAL

## 2020-10-12 LAB
TRANSF REACT PLASRBC-IMP: NORMAL

## (undated) RX ORDER — LIDOCAINE HYDROCHLORIDE 10 MG/ML
INJECTION, SOLUTION EPIDURAL; INFILTRATION; INTRACAUDAL; PERINEURAL
Status: DISPENSED
Start: 2020-01-01